# Patient Record
Sex: FEMALE | Race: WHITE | NOT HISPANIC OR LATINO | Employment: PART TIME | URBAN - METROPOLITAN AREA
[De-identification: names, ages, dates, MRNs, and addresses within clinical notes are randomized per-mention and may not be internally consistent; named-entity substitution may affect disease eponyms.]

---

## 2023-04-15 DIAGNOSIS — M19.90 OSTEOARTHRITIS, UNSPECIFIED OSTEOARTHRITIS TYPE, UNSPECIFIED SITE: Primary | ICD-10-CM

## 2023-04-18 RX ORDER — MELOXICAM 7.5 MG/1
TABLET ORAL
Qty: 60 TABLET | Refills: 3 | Status: SHIPPED | OUTPATIENT
Start: 2023-04-18 | End: 2023-08-07

## 2023-04-20 RX ORDER — TOPIRAMATE 50 MG/1
50 TABLET, FILM COATED ORAL 2 TIMES DAILY
COMMUNITY
End: 2023-06-30 | Stop reason: DRUGHIGH

## 2023-04-20 RX ORDER — DULOXETIN HYDROCHLORIDE 60 MG/1
60 CAPSULE, DELAYED RELEASE ORAL
COMMUNITY
Start: 2023-04-10

## 2023-04-20 RX ORDER — BUPROPION HYDROCHLORIDE 300 MG/1
1 TABLET ORAL
COMMUNITY
Start: 2023-04-10 | End: 2024-02-01 | Stop reason: ALTCHOICE

## 2023-04-20 RX ORDER — BUSPIRONE HYDROCHLORIDE 15 MG/1
15 TABLET ORAL 2 TIMES DAILY
COMMUNITY
Start: 2023-04-10

## 2023-04-20 RX ORDER — ESOMEPRAZOLE MAGNESIUM 40 MG/1
1 CAPSULE, DELAYED RELEASE ORAL DAILY
COMMUNITY
Start: 2021-10-05 | End: 2023-06-30 | Stop reason: ALTCHOICE

## 2023-04-20 RX ORDER — NORETHINDRONE ACETATE/ETHINYL ESTRADIOL AND FERROUS FUMARATE 1.5-30(21)
1 KIT ORAL DAILY
COMMUNITY
End: 2024-05-06 | Stop reason: ALTCHOICE

## 2023-04-20 RX ORDER — ACETAMINOPHEN 500 MG
1 TABLET ORAL DAILY
COMMUNITY
Start: 2019-01-23

## 2023-04-20 RX ORDER — CETIRIZINE HYDROCHLORIDE 10 MG/1
1 TABLET ORAL 2 TIMES DAILY
COMMUNITY
Start: 2021-02-04

## 2023-04-20 RX ORDER — CARVEDILOL 12.5 MG/1
12.5 TABLET ORAL 2 TIMES DAILY
COMMUNITY
End: 2023-09-06

## 2023-04-20 RX ORDER — ALBUTEROL SULFATE 90 UG/1
2 AEROSOL, METERED RESPIRATORY (INHALATION) EVERY 6 HOURS PRN
COMMUNITY
Start: 2020-03-04 | End: 2023-04-21 | Stop reason: SDUPTHER

## 2023-04-20 RX ORDER — CYCLOBENZAPRINE HCL 10 MG
10 TABLET ORAL 3 TIMES DAILY PRN
COMMUNITY
Start: 2021-10-05 | End: 2023-05-17

## 2023-04-20 RX ORDER — DULOXETIN HYDROCHLORIDE 30 MG/1
30 CAPSULE, DELAYED RELEASE ORAL
COMMUNITY
Start: 2023-04-10

## 2023-04-20 RX ORDER — ROSUVASTATIN CALCIUM 10 MG/1
10 TABLET, COATED ORAL DAILY
COMMUNITY
End: 2023-11-27

## 2023-04-20 RX ORDER — SENNOSIDES 8.6 MG/1
2 TABLET ORAL DAILY
COMMUNITY
End: 2024-02-01 | Stop reason: WASHOUT

## 2023-04-20 RX ORDER — TRAZODONE HYDROCHLORIDE 100 MG/1
1 TABLET ORAL NIGHTLY
COMMUNITY
Start: 2022-02-28

## 2023-04-20 RX ORDER — HYDROXYZINE PAMOATE 50 MG/1
50 CAPSULE ORAL 3 TIMES DAILY PRN
COMMUNITY
Start: 2021-07-19

## 2023-04-20 NOTE — PROGRESS NOTES
Subjective   Patient ID: Bree Lamar is a 32 y.o. female who presents for follow-up visit and lab work    HPI   The patient reports near constant aching pain throughout the lower back region since soon after her last office visit.  She reports that the only time she does not experience pain is when she is lying down.  She reports no radiation of discomfort.  She does report experiencing a brief episode of numbness in both eyes this morning but otherwise reports no bilateral lower extremity weakness/paresthesias, change in bowel/bladder function, preceding trauma/overexertion.    The patient reports continued chronic intermittent episodes of dull aching pain in both knees times years.  She again reports that the episodes may be precipitated by weather changes and by getting on the floor.  She reports no associated swelling or instability.  No other associated symptoms.  She does report an increase in the frequency and intensity of the episodes since her last office visit.    The patient reports a recent history of postnasal drip which she feels improves when she is able to use Flonase nasal spray and Zyrtec..  She also reports a recent history of dry mouth.  She does report continued chronic dyspnea with mild to moderate exertion-unchanged since her last office visit.  No other associated symptoms.    She reports less sweating since her last office visit.    She does report continued chronic near constant diffuse pruritus and continued chronic easier fatigability-unchanged since her last office visit.  No other new complaints.  Review of Systems    Objective   There were no vitals taken for this visit.    Physical Exam  Head-palpation revealed no tenderness over the maxillary or frontal sinuses  Nose-turbinates not erythematous or swollen  Mouth-no xerostomia noted.  Posterior pharynx not erythematous.  Tonsillar pillars appeared normal, no exudate  Cardiac-rate normal, rhythm regular positive S4 noted,,no  murmurs, no JVD.  Abdomen-soft, obese, hypoactive bowel sounds. Palpation revealed no tenderness or masses  Extremities-no peripheral edema    Musculoskeletal  Lumbar spine-no erythema or swelling.  Full range of motion with pain noted on flexion and to a lesser extent extension.  Full range of motion with no pain noted on rotation and bending bilaterally.  Palpation did reveal tenderness over the entire lumbosacral region extending to both buttocks, no increase in warmth  Knees-no erythema or swelling.  Full range of motion in all directions of motion with no pain.  Palpation did reveal mild tenderness over the medial surfaces of both knees as well as mild crepitus but no increase in warmth.  Negative Lachemann's test, negative Mary Kay test, negative patellar apprehension test  Neurologic  Lower extremities:  Motor-strength 5/5 in all muscle groups  Sensory  Light touch and pinprick sensation fully intact  Reflexes-1+/4 bilaterally  Assessment/Plan        Assessment  Hypertension-blood pressure at goal today.  Chronic dyspnea with mild to moderate exertion-may be secondary to lack of conditioning, postacute COVID-19 symptom.  COVID-19 December 2022, influenza, December 2022  Eustachian tube dysfunction right ear August 2021  Irritable bowel syndrome-mixed  Chronic intermittent episodes of diffuse arthralgias and myalgias--may be secondary to fibromyalgia-  Chronic intermittent episodes of dull aching pain in both knees-probably secondary to osteoarthritis.  Hyperlipidemia.  Chronic near constant diffuse pruritus-unsure of etiology.  May be secondary to xerosis.  Chronic easier fatigability-may represent a post COVID-19 symptom  Chronic intermittent episodes of dull pain over the entire head or over different regions of the head-may be secondary to chronic daily migraine.  Chronic near constant aching pain in the lower back region-probably secondary to osteoarthritis and degenerative disc disease of the lumbosacral  spine  Depression  Anxiety  Plan  Obtain hepatic function profile, fasting lipid profile today.  Obtain CBC differential as well today..  Obtain TSH, SSA and SSB antibodies today.  I have urged the patient to use Flonase nasal spray and Zyrtec on a as needed basis.  At patient request, she will discontinue over-the-counter Nexium and begin pantoprazole 40 mg daily.  I urged the patient to apply moisturizing cream such as Eucerin or Lubriderm twice daily over the body.  I also told the patient to apply Voltaren gel to painful regions every 4-6 hours as needed.  I recommended that she increase her topiramate dosage to 100 mg every morning, 50 mg p.o. at bedtime.  The patient also may be a good candidate for initiation of therapy with Ozempic or Mounjaro  I have asked that the patient continue all of her other current medications pending the results of lab work.  Patient should return for office visit in 3 months

## 2023-04-21 ENCOUNTER — OFFICE VISIT (OUTPATIENT)
Dept: PRIMARY CARE | Facility: CLINIC | Age: 33
End: 2023-04-21
Payer: MEDICAID

## 2023-04-21 ENCOUNTER — LAB (OUTPATIENT)
Dept: LAB | Facility: LAB | Age: 33
End: 2023-04-21
Payer: MEDICAID

## 2023-04-21 VITALS
WEIGHT: 292 LBS | DIASTOLIC BLOOD PRESSURE: 76 MMHG | SYSTOLIC BLOOD PRESSURE: 110 MMHG | BODY MASS INDEX: 48.59 KG/M2 | HEART RATE: 92 BPM

## 2023-04-21 DIAGNOSIS — R68.2 DRY MOUTH, UNSPECIFIED: ICD-10-CM

## 2023-04-21 DIAGNOSIS — E78.2 MIXED HYPERLIPIDEMIA: ICD-10-CM

## 2023-04-21 DIAGNOSIS — M79.7 FIBROMYALGIA: ICD-10-CM

## 2023-04-21 DIAGNOSIS — J98.01 BRONCHOSPASM: ICD-10-CM

## 2023-04-21 DIAGNOSIS — G43.709 CHRONIC MIGRAINE WITHOUT AURA WITHOUT STATUS MIGRAINOSUS, NOT INTRACTABLE: ICD-10-CM

## 2023-04-21 DIAGNOSIS — K30 FUNCTIONAL DYSPEPSIA: Primary | ICD-10-CM

## 2023-04-21 DIAGNOSIS — K30 FUNCTIONAL DYSPEPSIA: ICD-10-CM

## 2023-04-21 DIAGNOSIS — I10 PRIMARY HYPERTENSION: ICD-10-CM

## 2023-04-21 PROBLEM — E78.5 HYPERLIPIDEMIA: Status: ACTIVE | Noted: 2023-04-21

## 2023-04-21 PROBLEM — F41.9 ANXIETY DISORDER: Status: ACTIVE | Noted: 2023-04-21

## 2023-04-21 PROBLEM — K58.9 IBS (IRRITABLE BOWEL SYNDROME): Status: ACTIVE | Noted: 2023-04-21

## 2023-04-21 PROBLEM — R10.9 ABDOMINAL PAIN: Status: ACTIVE | Noted: 2023-04-21

## 2023-04-21 PROBLEM — G43.909 MIGRAINE: Status: ACTIVE | Noted: 2023-04-21

## 2023-04-21 LAB
ALANINE AMINOTRANSFERASE (SGPT) (U/L) IN SER/PLAS: 12 U/L (ref 7–45)
ALBUMIN (G/DL) IN SER/PLAS: 4.1 G/DL (ref 3.4–5)
ALKALINE PHOSPHATASE (U/L) IN SER/PLAS: 59 U/L (ref 33–110)
ANION GAP IN SER/PLAS: 16 MMOL/L (ref 10–20)
ANTI-SSA: <0.2 AI
ANTI-SSB: <0.2 AI
ASPARTATE AMINOTRANSFERASE (SGOT) (U/L) IN SER/PLAS: 12 U/L (ref 9–39)
BASOPHILS (10*3/UL) IN BLOOD BY AUTOMATED COUNT: 0.07 X10E9/L (ref 0–0.1)
BASOPHILS/100 LEUKOCYTES IN BLOOD BY AUTOMATED COUNT: 0.6 % (ref 0–2)
BILIRUBIN TOTAL (MG/DL) IN SER/PLAS: 0.3 MG/DL (ref 0–1.2)
CALCIUM (MG/DL) IN SER/PLAS: 9.6 MG/DL (ref 8.6–10.6)
CARBON DIOXIDE, TOTAL (MMOL/L) IN SER/PLAS: 21 MMOL/L (ref 21–32)
CHLORIDE (MMOL/L) IN SER/PLAS: 106 MMOL/L (ref 98–107)
CHOLESTEROL (MG/DL) IN SER/PLAS: 250 MG/DL (ref 0–199)
CHOLESTEROL IN HDL (MG/DL) IN SER/PLAS: 76.9 MG/DL
CHOLESTEROL/HDL RATIO: 3.3
CREATININE (MG/DL) IN SER/PLAS: 0.98 MG/DL (ref 0.5–1.05)
EOSINOPHILS (10*3/UL) IN BLOOD BY AUTOMATED COUNT: 0.27 X10E9/L (ref 0–0.7)
EOSINOPHILS/100 LEUKOCYTES IN BLOOD BY AUTOMATED COUNT: 2.5 % (ref 0–6)
ERYTHROCYTE DISTRIBUTION WIDTH (RATIO) BY AUTOMATED COUNT: 13.8 % (ref 11.5–14.5)
ERYTHROCYTE MEAN CORPUSCULAR HEMOGLOBIN CONCENTRATION (G/DL) BY AUTOMATED: 32.5 G/DL (ref 32–36)
ERYTHROCYTE MEAN CORPUSCULAR VOLUME (FL) BY AUTOMATED COUNT: 89 FL (ref 80–100)
ERYTHROCYTES (10*6/UL) IN BLOOD BY AUTOMATED COUNT: 3.96 X10E12/L (ref 4–5.2)
GFR FEMALE: 77 ML/MIN/1.73M2
GLUCOSE (MG/DL) IN SER/PLAS: 94 MG/DL (ref 74–99)
HEMATOCRIT (%) IN BLOOD BY AUTOMATED COUNT: 35.1 % (ref 36–46)
HEMOGLOBIN (G/DL) IN BLOOD: 11.4 G/DL (ref 12–16)
IMMATURE GRANULOCYTES/100 LEUKOCYTES IN BLOOD BY AUTOMATED COUNT: 0.5 % (ref 0–0.9)
LDL: 135 MG/DL (ref 0–99)
LEUKOCYTES (10*3/UL) IN BLOOD BY AUTOMATED COUNT: 11 X10E9/L (ref 4.4–11.3)
LYMPHOCYTES (10*3/UL) IN BLOOD BY AUTOMATED COUNT: 3.7 X10E9/L (ref 1.2–4.8)
LYMPHOCYTES/100 LEUKOCYTES IN BLOOD BY AUTOMATED COUNT: 33.7 % (ref 13–44)
MONOCYTES (10*3/UL) IN BLOOD BY AUTOMATED COUNT: 0.55 X10E9/L (ref 0.1–1)
MONOCYTES/100 LEUKOCYTES IN BLOOD BY AUTOMATED COUNT: 5 % (ref 2–10)
NEUTROPHILS (10*3/UL) IN BLOOD BY AUTOMATED COUNT: 6.33 X10E9/L (ref 1.2–7.7)
NEUTROPHILS/100 LEUKOCYTES IN BLOOD BY AUTOMATED COUNT: 57.7 % (ref 40–80)
NRBC (PER 100 WBCS) BY AUTOMATED COUNT: 0 /100 WBC (ref 0–0)
PLATELETS (10*3/UL) IN BLOOD AUTOMATED COUNT: 383 X10E9/L (ref 150–450)
POTASSIUM (MMOL/L) IN SER/PLAS: 3.9 MMOL/L (ref 3.5–5.3)
PROTEIN TOTAL: 6.9 G/DL (ref 6.4–8.2)
SODIUM (MMOL/L) IN SER/PLAS: 139 MMOL/L (ref 136–145)
THYROTROPIN (MIU/L) IN SER/PLAS BY DETECTION LIMIT <= 0.05 MIU/L: 2.74 MIU/L (ref 0.44–3.98)
TRIGLYCERIDE (MG/DL) IN SER/PLAS: 193 MG/DL (ref 0–149)
UREA NITROGEN (MG/DL) IN SER/PLAS: 11 MG/DL (ref 6–23)
VLDL: 39 MG/DL (ref 0–40)

## 2023-04-21 PROCEDURE — 99214 OFFICE O/P EST MOD 30 MIN: CPT | Performed by: INTERNAL MEDICINE

## 2023-04-21 PROCEDURE — 80061 LIPID PANEL: CPT

## 2023-04-21 PROCEDURE — 86235 NUCLEAR ANTIGEN ANTIBODY: CPT

## 2023-04-21 PROCEDURE — 84443 ASSAY THYROID STIM HORMONE: CPT

## 2023-04-21 PROCEDURE — 82728 ASSAY OF FERRITIN: CPT

## 2023-04-21 PROCEDURE — 3074F SYST BP LT 130 MM HG: CPT | Performed by: INTERNAL MEDICINE

## 2023-04-21 PROCEDURE — 3078F DIAST BP <80 MM HG: CPT | Performed by: INTERNAL MEDICINE

## 2023-04-21 PROCEDURE — 36415 COLL VENOUS BLD VENIPUNCTURE: CPT

## 2023-04-21 PROCEDURE — 85025 COMPLETE CBC W/AUTO DIFF WBC: CPT

## 2023-04-21 PROCEDURE — 82607 VITAMIN B-12: CPT

## 2023-04-21 PROCEDURE — 80053 COMPREHEN METABOLIC PANEL: CPT

## 2023-04-21 RX ORDER — ALBUTEROL SULFATE 90 UG/1
2 AEROSOL, METERED RESPIRATORY (INHALATION) EVERY 6 HOURS PRN
Qty: 18 G | Refills: 2 | Status: SHIPPED | OUTPATIENT
Start: 2023-04-21

## 2023-04-21 RX ORDER — PANTOPRAZOLE SODIUM 40 MG/1
40 TABLET, DELAYED RELEASE ORAL DAILY
Qty: 30 TABLET | Refills: 1 | Status: SHIPPED | OUTPATIENT
Start: 2023-04-21 | End: 2023-06-30 | Stop reason: SDUPTHER

## 2023-04-23 DIAGNOSIS — E78.2 MIXED HYPERLIPIDEMIA: Primary | ICD-10-CM

## 2023-04-23 DIAGNOSIS — E66.01 CLASS 3 SEVERE OBESITY DUE TO EXCESS CALORIES WITH SERIOUS COMORBIDITY AND BODY MASS INDEX (BMI) OF 40.0 TO 44.9 IN ADULT (MULTI): ICD-10-CM

## 2023-04-23 LAB
COBALAMIN (VITAMIN B12) (PG/ML) IN SER/PLAS: 435 PG/ML (ref 211–911)
FERRITIN (UG/LL) IN SER/PLAS: 26 UG/L (ref 8–150)

## 2023-04-23 RX ORDER — TIRZEPATIDE 2.5 MG/.5ML
2.5 INJECTION, SOLUTION SUBCUTANEOUS
Qty: 4 ML | Refills: 2 | Status: SHIPPED | OUTPATIENT
Start: 2023-04-23 | End: 2023-06-30 | Stop reason: SDUPTHER

## 2023-05-13 DIAGNOSIS — M79.7 FIBROMYALGIA: Primary | ICD-10-CM

## 2023-05-17 RX ORDER — CYCLOBENZAPRINE HCL 10 MG
TABLET ORAL
Qty: 90 TABLET | Refills: 3 | Status: SHIPPED | OUTPATIENT
Start: 2023-05-17 | End: 2023-11-27

## 2023-06-29 NOTE — PROGRESS NOTES
Subjective   Patient ID: Mal Lamar is a 32 y.o. adult who presents for back pain      HPI   The patient reports continued chronic near constant pain-unable to describe-throughout the lower back since her last office visit.  She reports an increase in the intensity of the pain with almost any movement.  She also reports experiencing frequent episodes of tingling throughout the lower extremities since her last office visit.  She reports no lower extremity weakness.  She reports no recent trauma/overexertion..  She does report a recent history of constipation as well.  She reports no change in bladder habits.  No other associated symptoms.  She reports a progressive increase in the frequency and intensity of pain since her last office visit  Review of Systems    Objective   There were no vitals taken for this visit.    Physical Exam  Musculoskeletal  Lumbar spine-no erythema or swelling.  Full range of motion with increased intensity of pain in all directions of motion with the exception of flexion.  Full range of motion with no increased intensity of pain on flexion.  Palpation revealed no increased tenderness or increase in warmth    Neurologic  Lower extremities:  Motor-strength 5/5 in all muscle groups tested  Sensory  Light touch and pinprick sensation fully intact  Reflexes 2+/4 bilaterally  Assessment/Plan        Assessment  Recent history of constipation-May be secondary to flare of irritable bowel syndrome-mixed  Near constant pain-unable to describe-in the lower back region with frequent episodes of tingling throughout the lower extremities-May be secondary to bilateral lumbar radiculopathies secondary to central canal stenosis lumbar spine, bilateral neuroforaminal stenosis lumbar spine.  Osteoarthritis and degenerative disc disease of the lumbosacral spine is a possible etiology.  Flare of fibromyalgia is a possible etiology.  Plan  Obtain x-rays of the lumbosacral spine today.  Begin Medrol Dosepak as  directed.  Begin application of a Lidoderm patch to the lower back region 12 hours on 12 hours off  I have urged the patient to continue cyclobenzaprine at a dose of 10 mg p.o. 3 times daily rather than 10 mg p.o. twice daily.  I told the patient to begin use of MiraLAX 17 g p.o. daily as needed.  The patient will probably be a good candidate for physical therapy referral

## 2023-06-30 ENCOUNTER — OFFICE VISIT (OUTPATIENT)
Dept: PRIMARY CARE | Facility: CLINIC | Age: 33
End: 2023-06-30

## 2023-06-30 VITALS
WEIGHT: 293 LBS | SYSTOLIC BLOOD PRESSURE: 114 MMHG | DIASTOLIC BLOOD PRESSURE: 94 MMHG | HEART RATE: 94 BPM | BODY MASS INDEX: 50.26 KG/M2

## 2023-06-30 DIAGNOSIS — E66.01 CLASS 3 SEVERE OBESITY DUE TO EXCESS CALORIES WITH SERIOUS COMORBIDITY AND BODY MASS INDEX (BMI) OF 40.0 TO 44.9 IN ADULT (MULTI): ICD-10-CM

## 2023-06-30 DIAGNOSIS — M79.7 FIBROMYALGIA: Primary | ICD-10-CM

## 2023-06-30 DIAGNOSIS — K30 FUNCTIONAL DYSPEPSIA: ICD-10-CM

## 2023-06-30 DIAGNOSIS — E78.2 MIXED HYPERLIPIDEMIA: ICD-10-CM

## 2023-06-30 PROCEDURE — 3074F SYST BP LT 130 MM HG: CPT | Performed by: INTERNAL MEDICINE

## 2023-06-30 PROCEDURE — 3080F DIAST BP >= 90 MM HG: CPT | Performed by: INTERNAL MEDICINE

## 2023-06-30 PROCEDURE — 99213 OFFICE O/P EST LOW 20 MIN: CPT | Performed by: INTERNAL MEDICINE

## 2023-06-30 RX ORDER — TIRZEPATIDE 2.5 MG/.5ML
2.5 INJECTION, SOLUTION SUBCUTANEOUS
Qty: 4 ML | Refills: 2 | Status: SHIPPED | OUTPATIENT
Start: 2023-06-30 | End: 2024-02-01

## 2023-06-30 RX ORDER — TOPIRAMATE 50 MG/1
TABLET, FILM COATED ORAL
Qty: 90 TABLET | Refills: 2 | Status: SHIPPED | OUTPATIENT
Start: 2023-06-30 | End: 2023-11-27

## 2023-06-30 RX ORDER — METHYLPREDNISOLONE 4 MG/1
TABLET ORAL
Qty: 21 TABLET | Refills: 0 | Status: SHIPPED | OUTPATIENT
Start: 2023-06-30 | End: 2023-07-07

## 2023-06-30 RX ORDER — LIDOCAINE 50 MG/G
1 PATCH TOPICAL DAILY
COMMUNITY
End: 2023-06-30 | Stop reason: SDUPTHER

## 2023-06-30 RX ORDER — PANTOPRAZOLE SODIUM 40 MG/1
40 TABLET, DELAYED RELEASE ORAL DAILY
Qty: 90 TABLET | Refills: 2 | Status: SHIPPED | OUTPATIENT
Start: 2023-06-30 | End: 2024-05-06

## 2023-06-30 RX ORDER — LIDOCAINE 50 MG/G
1 PATCH TOPICAL DAILY
Qty: 30 PATCH | Refills: 3 | Status: SHIPPED | OUTPATIENT
Start: 2023-06-30 | End: 2023-07-03 | Stop reason: SDUPTHER

## 2023-06-30 RX ORDER — TOPIRAMATE 50 MG/1
TABLET, FILM COATED ORAL
Qty: 90 TABLET | Refills: 2 | Status: SHIPPED | OUTPATIENT
Start: 2023-06-30 | End: 2023-06-30 | Stop reason: SDUPTHER

## 2023-07-02 ENCOUNTER — TELEPHONE (OUTPATIENT)
Dept: PRIMARY CARE | Facility: CLINIC | Age: 33
End: 2023-07-02
Payer: MEDICARE

## 2023-07-02 DIAGNOSIS — M54.59 MECHANICAL LOW BACK PAIN: Primary | ICD-10-CM

## 2023-07-03 ENCOUNTER — TELEPHONE (OUTPATIENT)
Dept: PRIMARY CARE | Facility: CLINIC | Age: 33
End: 2023-07-03

## 2023-07-03 DIAGNOSIS — E78.2 MIXED HYPERLIPIDEMIA: ICD-10-CM

## 2023-07-03 DIAGNOSIS — M79.7 FIBROMYALGIA: ICD-10-CM

## 2023-07-03 DIAGNOSIS — E66.01 CLASS 3 SEVERE OBESITY DUE TO EXCESS CALORIES WITH SERIOUS COMORBIDITY AND BODY MASS INDEX (BMI) OF 40.0 TO 44.9 IN ADULT (MULTI): ICD-10-CM

## 2023-07-03 RX ORDER — LIDOCAINE 50 MG/G
1 PATCH TOPICAL DAILY
Qty: 30 PATCH | Refills: 3 | Status: SHIPPED | OUTPATIENT
Start: 2023-07-03 | End: 2024-02-01

## 2023-07-03 RX ORDER — LIDOCAINE 50 MG/G
1 PATCH TOPICAL DAILY
Qty: 30 PATCH | Refills: 3 | Status: SHIPPED | OUTPATIENT
Start: 2023-07-03 | End: 2023-07-03 | Stop reason: SDUPTHER

## 2023-08-06 DIAGNOSIS — M19.90 OSTEOARTHRITIS, UNSPECIFIED OSTEOARTHRITIS TYPE, UNSPECIFIED SITE: ICD-10-CM

## 2023-08-07 RX ORDER — MELOXICAM 7.5 MG/1
TABLET ORAL
Qty: 60 TABLET | Refills: 3 | Status: SHIPPED | OUTPATIENT
Start: 2023-08-07 | End: 2023-12-26

## 2023-09-03 DIAGNOSIS — K30 FUNCTIONAL DYSPEPSIA: Primary | ICD-10-CM

## 2023-09-06 RX ORDER — CARVEDILOL 12.5 MG/1
12.5 TABLET ORAL 2 TIMES DAILY
Qty: 60 TABLET | Refills: 3 | Status: SHIPPED | OUTPATIENT
Start: 2023-09-06 | End: 2024-01-24

## 2023-11-24 DIAGNOSIS — M79.7 FIBROMYALGIA: ICD-10-CM

## 2023-11-27 RX ORDER — ROSUVASTATIN CALCIUM 10 MG/1
10 TABLET, COATED ORAL DAILY
Qty: 90 TABLET | Refills: 0 | Status: SHIPPED | OUTPATIENT
Start: 2023-11-27 | End: 2024-03-20 | Stop reason: SDUPTHER

## 2023-11-27 RX ORDER — CYCLOBENZAPRINE HCL 10 MG
TABLET ORAL
Qty: 90 TABLET | Refills: 0 | Status: SHIPPED | OUTPATIENT
Start: 2023-11-27 | End: 2024-01-02

## 2023-11-27 RX ORDER — TOPIRAMATE 50 MG/1
TABLET, FILM COATED ORAL
Qty: 90 TABLET | Refills: 0 | Status: SHIPPED | OUTPATIENT
Start: 2023-11-27 | End: 2024-01-02

## 2023-11-29 DIAGNOSIS — K30 FUNCTIONAL DYSPEPSIA: ICD-10-CM

## 2023-12-24 DIAGNOSIS — M19.90 OSTEOARTHRITIS, UNSPECIFIED OSTEOARTHRITIS TYPE, UNSPECIFIED SITE: ICD-10-CM

## 2023-12-26 RX ORDER — MELOXICAM 7.5 MG/1
TABLET ORAL
Qty: 60 TABLET | Refills: 3 | Status: SHIPPED | OUTPATIENT
Start: 2023-12-26 | End: 2024-05-06

## 2023-12-31 DIAGNOSIS — M79.7 FIBROMYALGIA: ICD-10-CM

## 2024-01-02 RX ORDER — TOPIRAMATE 50 MG/1
TABLET, FILM COATED ORAL
Qty: 270 TABLET | Refills: 3 | Status: SHIPPED | OUTPATIENT
Start: 2024-01-02

## 2024-01-02 RX ORDER — CYCLOBENZAPRINE HCL 10 MG
TABLET ORAL
Qty: 90 TABLET | Refills: 1 | Status: SHIPPED | OUTPATIENT
Start: 2024-01-02 | End: 2024-03-20 | Stop reason: SDUPTHER

## 2024-01-21 DIAGNOSIS — K30 FUNCTIONAL DYSPEPSIA: ICD-10-CM

## 2024-01-24 RX ORDER — CARVEDILOL 12.5 MG/1
12.5 TABLET ORAL 2 TIMES DAILY
Qty: 60 TABLET | Refills: 11 | Status: SHIPPED | OUTPATIENT
Start: 2024-01-24

## 2024-01-30 PROBLEM — M54.59 MECHANICAL LOW BACK PAIN: Status: ACTIVE | Noted: 2024-01-30

## 2024-01-30 PROBLEM — G37.9 DEMYELINATING DISORDER (MULTI): Status: ACTIVE | Noted: 2024-01-30

## 2024-01-30 PROBLEM — M25.531 WRIST PAIN, CHRONIC, RIGHT: Status: ACTIVE | Noted: 2024-01-30

## 2024-01-30 PROBLEM — F17.200 SMOKING: Status: ACTIVE | Noted: 2024-01-30

## 2024-01-30 PROBLEM — M54.12 CERVICAL RADICULOPATHY: Status: ACTIVE | Noted: 2024-01-30

## 2024-01-30 PROBLEM — H92.09 OTALGIA: Status: RESOLVED | Noted: 2024-01-30 | Resolved: 2024-01-30

## 2024-01-30 PROBLEM — R26.89 IMBALANCE: Status: ACTIVE | Noted: 2024-01-30

## 2024-01-30 PROBLEM — J02.9 SORE THROAT: Status: RESOLVED | Noted: 2024-01-30 | Resolved: 2024-01-30

## 2024-01-30 PROBLEM — R11.0 NAUSEA: Status: RESOLVED | Noted: 2024-01-30 | Resolved: 2024-01-30

## 2024-01-30 PROBLEM — M25.572 ACUTE LEFT ANKLE PAIN: Status: RESOLVED | Noted: 2024-01-30 | Resolved: 2024-01-30

## 2024-01-30 PROBLEM — F33.0 MILD EPISODE OF RECURRENT MAJOR DEPRESSIVE DISORDER (CMS-HCC): Chronic | Status: ACTIVE | Noted: 2021-06-21

## 2024-01-30 PROBLEM — M19.90 OSTEOARTHRITIS: Status: ACTIVE | Noted: 2024-01-30

## 2024-01-30 PROBLEM — F43.9 STRESS: Status: ACTIVE | Noted: 2024-01-30

## 2024-01-30 PROBLEM — R19.7 DIARRHEA: Status: RESOLVED | Noted: 2024-01-30 | Resolved: 2024-01-30

## 2024-01-30 PROBLEM — H10.13 ALLERGIC CONJUNCTIVITIS OF BOTH EYES: Status: RESOLVED | Noted: 2024-01-30 | Resolved: 2024-01-30

## 2024-01-30 PROBLEM — H66.91 RIGHT ACUTE OTITIS MEDIA: Status: RESOLVED | Noted: 2024-01-30 | Resolved: 2024-01-30

## 2024-01-30 PROBLEM — E55.9 VITAMIN D DEFICIENCY: Status: ACTIVE | Noted: 2024-01-30

## 2024-01-30 PROBLEM — J01.00 ACUTE NON-RECURRENT MAXILLARY SINUSITIS: Status: RESOLVED | Noted: 2024-01-30 | Resolved: 2024-01-30

## 2024-01-30 PROBLEM — R20.2 PARESTHESIA OF HAND: Status: ACTIVE | Noted: 2024-01-30

## 2024-01-30 PROBLEM — B37.2 CANDIDAL INTERTRIGO: Status: ACTIVE | Noted: 2024-01-30

## 2024-01-30 PROBLEM — B34.9 PHARYNGITIS WITH VIRAL SYNDROME: Status: RESOLVED | Noted: 2024-01-30 | Resolved: 2024-01-30

## 2024-01-30 PROBLEM — J34.0 ABSCESS OF NOSE: Status: RESOLVED | Noted: 2024-01-30 | Resolved: 2024-01-30

## 2024-01-30 PROBLEM — H69.91 ACUTE DYSFUNCTION OF RIGHT EUSTACHIAN TUBE: Status: RESOLVED | Noted: 2024-01-30 | Resolved: 2024-01-30

## 2024-01-30 PROBLEM — L30.4 INTERTRIGO: Status: ACTIVE | Noted: 2024-01-30

## 2024-01-30 PROBLEM — R29.898 HAND WEAKNESS: Status: ACTIVE | Noted: 2024-01-30

## 2024-01-30 PROBLEM — H93.8X9 EAR LUMP: Status: ACTIVE | Noted: 2024-01-30

## 2024-01-30 PROBLEM — F84.0 AUTISM (HHS-HCC): Status: ACTIVE | Noted: 2021-06-21

## 2024-01-30 PROBLEM — G89.29 WRIST PAIN, CHRONIC, RIGHT: Status: ACTIVE | Noted: 2024-01-30

## 2024-01-30 PROBLEM — S96.912A STRAIN OF LEFT ANKLE: Status: RESOLVED | Noted: 2024-01-30 | Resolved: 2024-01-30

## 2024-01-30 PROBLEM — N93.8 DUB (DYSFUNCTIONAL UTERINE BLEEDING): Status: ACTIVE | Noted: 2024-01-30

## 2024-01-30 PROBLEM — J34.0 CELLULITIS OF NOSE, EXTERNAL: Status: RESOLVED | Noted: 2024-01-30 | Resolved: 2024-01-30

## 2024-01-30 PROBLEM — N39.0 URINARY TRACT INFECTION: Status: RESOLVED | Noted: 2024-01-30 | Resolved: 2024-01-30

## 2024-01-30 PROBLEM — K04.7 DENTAL INFECTION: Status: RESOLVED | Noted: 2024-01-30 | Resolved: 2024-01-30

## 2024-01-30 PROBLEM — R50.9 FEVER, INTERMITTENT: Status: RESOLVED | Noted: 2024-01-30 | Resolved: 2024-01-30

## 2024-01-30 PROBLEM — H10.9 CONJUNCTIVITIS: Status: RESOLVED | Noted: 2024-01-30 | Resolved: 2024-01-30

## 2024-01-30 PROBLEM — J02.9 PHARYNGITIS WITH VIRAL SYNDROME: Status: RESOLVED | Noted: 2024-01-30 | Resolved: 2024-01-30

## 2024-01-30 PROBLEM — J45.909 ASTHMATIC BRONCHITIS (HHS-HCC): Status: RESOLVED | Noted: 2024-01-30 | Resolved: 2024-01-30

## 2024-01-30 PROBLEM — N92.0 MENORRHAGIA WITH REGULAR CYCLE: Status: ACTIVE | Noted: 2024-01-30

## 2024-01-30 PROBLEM — G43.829 MENSTRUAL MIGRAINE WITHOUT STATUS MIGRAINOSUS, NOT INTRACTABLE: Status: ACTIVE | Noted: 2024-01-30

## 2024-01-30 RX ORDER — TRAZODONE HYDROCHLORIDE 50 MG/1
TABLET ORAL
COMMUNITY
Start: 2021-03-30 | End: 2024-05-06 | Stop reason: ALTCHOICE

## 2024-01-30 RX ORDER — CLOTRIMAZOLE AND BETAMETHASONE DIPROPIONATE 10; .64 MG/G; MG/G
CREAM TOPICAL
COMMUNITY
Start: 2021-05-25

## 2024-01-30 RX ORDER — IBUPROFEN 100 MG/5ML
1000 SUSPENSION, ORAL (FINAL DOSE FORM) ORAL
COMMUNITY
End: 2024-02-01 | Stop reason: ALTCHOICE

## 2024-02-01 ENCOUNTER — HOSPITAL ENCOUNTER (OUTPATIENT)
Dept: VASCULAR MEDICINE | Facility: HOSPITAL | Age: 34
Discharge: HOME | End: 2024-02-01
Payer: MEDICARE

## 2024-02-01 ENCOUNTER — LAB (OUTPATIENT)
Dept: LAB | Facility: LAB | Age: 34
End: 2024-02-01
Payer: MEDICARE

## 2024-02-01 ENCOUNTER — OFFICE VISIT (OUTPATIENT)
Dept: PRIMARY CARE | Facility: CLINIC | Age: 34
End: 2024-02-01
Payer: MEDICARE

## 2024-02-01 VITALS
DIASTOLIC BLOOD PRESSURE: 85 MMHG | WEIGHT: 293 LBS | OXYGEN SATURATION: 94 % | BODY MASS INDEX: 50.02 KG/M2 | HEART RATE: 93 BPM | SYSTOLIC BLOOD PRESSURE: 127 MMHG | HEIGHT: 64 IN

## 2024-02-01 DIAGNOSIS — R06.09 DOE (DYSPNEA ON EXERTION): ICD-10-CM

## 2024-02-01 DIAGNOSIS — R60.0 BILATERAL LEG EDEMA: Primary | ICD-10-CM

## 2024-02-01 DIAGNOSIS — G43.829 MENSTRUAL MIGRAINE WITHOUT STATUS MIGRAINOSUS, NOT INTRACTABLE: ICD-10-CM

## 2024-02-01 DIAGNOSIS — L98.9 SKIN LESION: ICD-10-CM

## 2024-02-01 DIAGNOSIS — R60.0 BILATERAL LEG EDEMA: ICD-10-CM

## 2024-02-01 DIAGNOSIS — R11.0 NAUSEA IN ADULT: ICD-10-CM

## 2024-02-01 PROBLEM — D64.9 ANEMIA: Status: ACTIVE | Noted: 2024-02-01

## 2024-02-01 LAB
ALBUMIN SERPL BCP-MCNC: 4.1 G/DL (ref 3.4–5)
ALP SERPL-CCNC: 73 U/L (ref 33–120)
ALT SERPL W P-5'-P-CCNC: 31 U/L (ref 7–52)
ANION GAP SERPL CALC-SCNC: 16 MMOL/L (ref 10–20)
AST SERPL W P-5'-P-CCNC: 36 U/L (ref 9–39)
BILIRUB SERPL-MCNC: 0.3 MG/DL (ref 0–1.2)
BNP SERPL-MCNC: 7 PG/ML (ref 0–99)
BUN SERPL-MCNC: 18 MG/DL (ref 6–23)
CALCIUM SERPL-MCNC: 9.4 MG/DL (ref 8.6–10.3)
CHLORIDE SERPL-SCNC: 106 MMOL/L (ref 98–107)
CO2 SERPL-SCNC: 20 MMOL/L (ref 21–32)
CREAT SERPL-MCNC: 0.93 MG/DL (ref 0.5–1.3)
D DIMER PPP FEU-MCNC: 303 NG/ML FEU
EGFRCR SERPLBLD CKD-EPI 2021: 82 ML/MIN/1.73M*2
GLUCOSE SERPL-MCNC: 89 MG/DL (ref 74–99)
MAGNESIUM SERPL-MCNC: 1.9 MG/DL (ref 1.6–2.4)
POC APPEARANCE, URINE: CLEAR
POC BILIRUBIN, URINE: NEGATIVE
POC BLOOD, URINE: ABNORMAL
POC COLOR, URINE: YELLOW
POC GLUCOSE, URINE: NEGATIVE MG/DL
POC KETONES, URINE: NEGATIVE MG/DL
POC LEUKOCYTES, URINE: ABNORMAL
POC NITRITE,URINE: NEGATIVE
POC PH, URINE: 6 PH
POC PROTEIN, URINE: NEGATIVE MG/DL
POC SPECIFIC GRAVITY, URINE: 1.01
POC UROBILINOGEN, URINE: 0.2 EU/DL
POTASSIUM SERPL-SCNC: 4.3 MMOL/L (ref 3.5–5.3)
PROT SERPL-MCNC: 6.8 G/DL (ref 6.4–8.2)
SODIUM SERPL-SCNC: 138 MMOL/L (ref 136–145)
TSH SERPL-ACNC: 3.22 MIU/L (ref 0.44–3.98)

## 2024-02-01 PROCEDURE — 3074F SYST BP LT 130 MM HG: CPT | Performed by: FAMILY MEDICINE

## 2024-02-01 PROCEDURE — 80053 COMPREHEN METABOLIC PANEL: CPT

## 2024-02-01 PROCEDURE — 99214 OFFICE O/P EST MOD 30 MIN: CPT | Performed by: FAMILY MEDICINE

## 2024-02-01 PROCEDURE — 83735 ASSAY OF MAGNESIUM: CPT

## 2024-02-01 PROCEDURE — 84443 ASSAY THYROID STIM HORMONE: CPT

## 2024-02-01 PROCEDURE — 83880 ASSAY OF NATRIURETIC PEPTIDE: CPT

## 2024-02-01 PROCEDURE — 3008F BODY MASS INDEX DOCD: CPT | Performed by: FAMILY MEDICINE

## 2024-02-01 PROCEDURE — 85379 FIBRIN DEGRADATION QUANT: CPT

## 2024-02-01 PROCEDURE — 93970 EXTREMITY STUDY: CPT | Performed by: INTERNAL MEDICINE

## 2024-02-01 PROCEDURE — 81003 URINALYSIS AUTO W/O SCOPE: CPT | Performed by: FAMILY MEDICINE

## 2024-02-01 PROCEDURE — 93970 EXTREMITY STUDY: CPT

## 2024-02-01 PROCEDURE — 3079F DIAST BP 80-89 MM HG: CPT | Performed by: FAMILY MEDICINE

## 2024-02-01 PROCEDURE — 1036F TOBACCO NON-USER: CPT | Performed by: FAMILY MEDICINE

## 2024-02-01 RX ORDER — ONDANSETRON 4 MG/1
1 TABLET, FILM COATED ORAL EVERY 8 HOURS PRN
COMMUNITY
End: 2024-02-01 | Stop reason: SDUPTHER

## 2024-02-01 RX ORDER — SUMATRIPTAN SUCCINATE 100 MG/1
100 TABLET ORAL ONCE AS NEEDED
COMMUNITY
End: 2024-02-01 | Stop reason: SDUPTHER

## 2024-02-01 RX ORDER — ONDANSETRON 4 MG/1
4 TABLET, FILM COATED ORAL EVERY 8 HOURS PRN
Qty: 20 TABLET | Refills: 0 | Status: SHIPPED | OUTPATIENT
Start: 2024-02-01

## 2024-02-01 RX ORDER — SUMATRIPTAN SUCCINATE 100 MG/1
100 TABLET ORAL ONCE AS NEEDED
Qty: 9 TABLET | Refills: 0 | Status: SHIPPED | OUTPATIENT
Start: 2024-02-01

## 2024-02-01 ASSESSMENT — ENCOUNTER SYMPTOMS
CONFUSION: 0
DIARRHEA: 0
NUMBNESS: 0
DIFFICULTY URINATING: 0
CHILLS: 0
LIGHT-HEADEDNESS: 0
FEVER: 0
UNEXPECTED WEIGHT CHANGE: 0
DYSURIA: 0
OCCASIONAL FEELINGS OF UNSTEADINESS: 0
LOSS OF SENSATION IN FEET: 0
COUGH: 0
DEPRESSION: 0
ABDOMINAL PAIN: 0
VOMITING: 0
NAUSEA: 0
DIZZINESS: 0
TROUBLE SWALLOWING: 0
PALPITATIONS: 0
BLOOD IN STOOL: 0
WHEEZING: 0
SHORTNESS OF BREATH: 0
WEAKNESS: 0

## 2024-02-01 ASSESSMENT — PATIENT HEALTH QUESTIONNAIRE - PHQ9
2. FEELING DOWN, DEPRESSED OR HOPELESS: SEVERAL DAYS
SUM OF ALL RESPONSES TO PHQ9 QUESTIONS 1 AND 2: 2
10. IF YOU CHECKED OFF ANY PROBLEMS, HOW DIFFICULT HAVE THESE PROBLEMS MADE IT FOR YOU TO DO YOUR WORK, TAKE CARE OF THINGS AT HOME, OR GET ALONG WITH OTHER PEOPLE: SOMEWHAT DIFFICULT
1. LITTLE INTEREST OR PLEASURE IN DOING THINGS: SEVERAL DAYS

## 2024-02-01 ASSESSMENT — COLUMBIA-SUICIDE SEVERITY RATING SCALE - C-SSRS
6. HAVE YOU EVER DONE ANYTHING, STARTED TO DO ANYTHING, OR PREPARED TO DO ANYTHING TO END YOUR LIFE?: YES
6. HAVE YOU EVER DONE ANYTHING, STARTED TO DO ANYTHING, OR PREPARED TO DO ANYTHING TO END YOUR LIFE?: NO
2. HAVE YOU ACTUALLY HAD ANY THOUGHTS OF KILLING YOURSELF?: NO
1. IN THE PAST MONTH, HAVE YOU WISHED YOU WERE DEAD OR WISHED YOU COULD GO TO SLEEP AND NOT WAKE UP?: NO

## 2024-02-01 NOTE — PROGRESS NOTES
"Subjective   Patient ID: Mal Lamar is a 33 y.o. adult who presents for Establish Care (Pt presents as a new to you pt to establish care, c/o edema feet bi-lateral, requesting labs, will need  rx's at this time.BL).    HPI     c/o bilateral swelling of feet. Started Dec 2023, never happened before. First right was worse, now left is worse. Denies pain/bruising/redness in the legs, chest/side/back pain, pain with deep inhalation, SOB, difficulty breathing. No recent medication change except stopped Wellbutrin. Tried reducing sodium intake, not sure if it helped.    Taking OCP, but quit smoking 8/2022, vaping at least 8 months ago.    c/o CHAN, but has gradually worsened with increasing weight, and since having COVID early Jan 2024.    Review of Systems   Constitutional:  Negative for chills, fever and unexpected weight change.   HENT:  Negative for ear pain and trouble swallowing.    Respiratory:  Negative for cough, shortness of breath and wheezing.    Cardiovascular:  Positive for leg swelling. Negative for chest pain and palpitations.   Gastrointestinal:  Negative for abdominal pain, blood in stool, diarrhea, nausea and vomiting.   Genitourinary:  Negative for difficulty urinating and dysuria.   Skin:  Negative for rash.   Neurological:  Negative for dizziness, syncope, weakness, light-headedness and numbness.   Psychiatric/Behavioral:  Negative for behavioral problems and confusion.        Objective   /85   Pulse 93   Ht 1.632 m (5' 4.25\")   Wt 139 kg (307 lb 8 oz)   SpO2 94%   BMI 52.37 kg/m²     Physical Exam  Vitals and nursing note reviewed.   Constitutional:       General: Bree Lamar \"Mal\" is not in acute distress.     Appearance: Bree Lamar \"Mal\" is not diaphoretic.   HENT:      Head: Normocephalic and atraumatic.   Eyes:      General: No scleral icterus.     Conjunctiva/sclera: Conjunctivae normal.   Cardiovascular:      Rate and Rhythm: Normal rate and regular rhythm.     " " Heart sounds: Normal heart sounds.   Pulmonary:      Effort: Pulmonary effort is normal.      Breath sounds: Normal breath sounds. No wheezing, rhonchi or rales.   Musculoskeletal:      Right lower leg: No tenderness (negative Nenita sign). 2+ Pitting Edema (49.5 cm) present.      Left lower leg: No tenderness (negative Nenita sign). 2+ Pitting Edema (53 cm) present.   Skin:     General: Skin is warm and dry.      Findings: Lesion (left breast) present.   Neurological:      General: No focal deficit present.      Mental Status: Bree Lamar \"Mal\" is alert. Mental status is at baseline.   Psychiatric:         Mood and Affect: Mood normal.         Thought Content: Thought content normal.         Assessment/Plan   Problem List Items Addressed This Visit             ICD-10-CM    Menstrual migraine without status migrainosus, not intractable G43.829     Reports rare need for Imitrex. Refill.         Nausea in adult R11.0     Rare. Likely secondary to gastritis/TED v. anxiety.         Bilateral leg edema - Primary R60.0     Asymmetric L>R. Wells DVT = -1 (venous insufficiency more likely). Recent COVID. Venous duplex.    UA neg for protein.         Relevant Medications    ondansetron (Zofran) 4 mg tablet    SUMAtriptan (Imitrex) 100 mg tablet    Other Relevant Orders    Comprehensive Metabolic Panel    TSH with reflex to Free T4 if abnormal    Magnesium    POCT UA Automated manually resulted    Vascular US lower extremity venous duplex bilateral    Referral to Dermatology    Transthoracic Echo Complete    D-dimer, quantitative    Skin lesion L98.9     Follow-up with dermatology, especially if not cleared up within 2 weeks.         CHAN (dyspnea on exertion) R06.09     Unlikely cardiac or VTE, but check BNP, echocardiogram, and D-dimer.         Relevant Orders    Transthoracic Echo Complete    B-type natriuretic peptide    D-dimer, quantitative          "

## 2024-02-01 NOTE — ASSESSMENT & PLAN NOTE
Asymmetric L>R. Wells DVT = -1 (venous insufficiency more likely). Recent COVID. Venous duplex.    UA neg for protein.

## 2024-02-01 NOTE — PATIENT INSTRUCTIONS
Recommend no more than 2000 mg of sodium daily.    Dermatology  Dr. Daly Meza, Chris Canela CNP PhD, et al. ([place]) [phone]    [facility]  Dr. Jensen Quintero NP-C  Lena Vergara PALOUISE Newanda Inés FNP-C  [address] [place] [address]  [phone]    Dr. Lucero Diaz PA-RAJENDRA Youngblood NP-C  [address], [place]  [phone]    [facility]  [address], [address]  [place] [address]  [phone]    [address], [address]  [place] [address]  [phone]    [address], [address]  [place], [place] [address] [phone]       Please return for a(n) follow-up appointment after tests to review results and options, earlier if any question or concern.    Avoid taking Biotin for a week prior to any blood tests, as it can interfere with certain results. Fasting for labs means 12 hours, nothing to eat or drink, except water and medications, unless directed otherwise.    For assistance with scheduling referrals or consultations, please call [phone]. For laboratory, radiology, and other tests, please call [phone] ([phone] for pediatrics). Please review prescription inserts and published information for possible adverse effects of all medications. Return after testing or consultation to review results and recommendations, if symptoms persist, change, worsen, or return, or if you have any question or concern. If you do not get results within 7-10 days, or you have any question or concern, please send a message, call the office ([phone]), or return to the office for a follow-up appointment. For non-emergencies, you may call the office. For emergencies, call 9-1-1 or go to the nearest Emergency Department. Please schedule additional appointment(s) to address concern(s) not addressed today.    In general, results are not released or discussed over the telephone, but at an  appointment or via  [employer]. Results of tests done through [facility] are released via  Househappy (see below).  [email or web address]   Househappy support line: [phone]

## 2024-02-04 DIAGNOSIS — R59.0 LYMPHADENOPATHY, INGUINAL: Primary | ICD-10-CM

## 2024-02-04 DIAGNOSIS — R60.0 LOCALIZED EDEMA: ICD-10-CM

## 2024-03-11 PROCEDURE — 87624 HPV HI-RISK TYP POOLED RSLT: CPT

## 2024-03-11 PROCEDURE — 88175 CYTOPATH C/V AUTO FLUID REDO: CPT

## 2024-03-12 ENCOUNTER — LAB REQUISITION (OUTPATIENT)
Dept: LAB | Facility: HOSPITAL | Age: 34
End: 2024-03-12
Payer: MEDICARE

## 2024-03-12 DIAGNOSIS — Z01.419 ENCOUNTER FOR GYNECOLOGICAL EXAMINATION (GENERAL) (ROUTINE) WITHOUT ABNORMAL FINDINGS: ICD-10-CM

## 2024-03-12 DIAGNOSIS — Z11.51 ENCOUNTER FOR SCREENING FOR HUMAN PAPILLOMAVIRUS (HPV): ICD-10-CM

## 2024-03-20 ENCOUNTER — OFFICE VISIT (OUTPATIENT)
Dept: PRIMARY CARE | Facility: CLINIC | Age: 34
End: 2024-03-20
Payer: MEDICARE

## 2024-03-20 ENCOUNTER — HOSPITAL ENCOUNTER (OUTPATIENT)
Dept: RADIOLOGY | Facility: CLINIC | Age: 34
Discharge: HOME | End: 2024-03-20
Payer: MEDICARE

## 2024-03-20 ENCOUNTER — LAB (OUTPATIENT)
Dept: LAB | Facility: LAB | Age: 34
End: 2024-03-20
Payer: MEDICARE

## 2024-03-20 VITALS
TEMPERATURE: 97.9 F | HEIGHT: 65 IN | OXYGEN SATURATION: 97 % | BODY MASS INDEX: 48.82 KG/M2 | DIASTOLIC BLOOD PRESSURE: 81 MMHG | HEART RATE: 93 BPM | WEIGHT: 293 LBS | SYSTOLIC BLOOD PRESSURE: 137 MMHG

## 2024-03-20 DIAGNOSIS — R10.32 LLQ ABDOMINAL PAIN: ICD-10-CM

## 2024-03-20 DIAGNOSIS — R10.32 LLQ ABDOMINAL PAIN: Primary | ICD-10-CM

## 2024-03-20 DIAGNOSIS — E78.5 HYPERLIPIDEMIA, UNSPECIFIED HYPERLIPIDEMIA TYPE: ICD-10-CM

## 2024-03-20 DIAGNOSIS — R82.90 ABNORMAL RESULT ON SCREENING URINE TEST: ICD-10-CM

## 2024-03-20 DIAGNOSIS — M79.7 FIBROMYALGIA: ICD-10-CM

## 2024-03-20 LAB
ALBUMIN SERPL BCP-MCNC: 4 G/DL (ref 3.4–5)
ALP SERPL-CCNC: 89 U/L (ref 33–120)
ALT SERPL W P-5'-P-CCNC: 15 U/L (ref 7–52)
ANION GAP SERPL CALC-SCNC: 15 MMOL/L (ref 10–20)
AST SERPL W P-5'-P-CCNC: 20 U/L (ref 9–39)
BILIRUB SERPL-MCNC: 0.4 MG/DL (ref 0–1.2)
BUN SERPL-MCNC: 16 MG/DL (ref 6–23)
CALCIUM SERPL-MCNC: 8.9 MG/DL (ref 8.6–10.3)
CHLORIDE SERPL-SCNC: 104 MMOL/L (ref 98–107)
CHOLEST SERPL-MCNC: 204 MG/DL (ref 0–199)
CHOLESTEROL/HDL RATIO: 2.7
CK SERPL-CCNC: 49 U/L (ref 0–325)
CO2 SERPL-SCNC: 21 MMOL/L (ref 21–32)
CREAT SERPL-MCNC: 1.46 MG/DL (ref 0.5–1.3)
EGFRCR SERPLBLD CKD-EPI 2021: 48 ML/MIN/1.73M*2
ERYTHROCYTE [DISTWIDTH] IN BLOOD BY AUTOMATED COUNT: 13.6 % (ref 11.5–14.5)
GLUCOSE SERPL-MCNC: 105 MG/DL (ref 74–99)
HCT VFR BLD AUTO: 39 % (ref 36–52)
HDLC SERPL-MCNC: 76.9 MG/DL
HGB BLD-MCNC: 12.3 G/DL (ref 12–17.5)
LACTATE SERPL-SCNC: 1.4 MMOL/L (ref 0.4–2)
LDLC SERPL CALC-MCNC: 77 MG/DL
LIPASE SERPL-CCNC: 10 U/L (ref 9–82)
MCH RBC QN AUTO: 28.5 PG (ref 26–34)
MCHC RBC AUTO-ENTMCNC: 31.5 G/DL (ref 32–36)
MCV RBC AUTO: 90 FL (ref 80–100)
NON HDL CHOLESTEROL: 127 MG/DL (ref 0–149)
NRBC BLD-RTO: 0 /100 WBCS (ref 0–0)
PLATELET # BLD AUTO: 414 X10*3/UL (ref 150–450)
POC APPEARANCE, URINE: CLEAR
POC BILIRUBIN, URINE: NEGATIVE
POC BLOOD, URINE: ABNORMAL
POC COLOR, URINE: YELLOW
POC GLUCOSE, URINE: NEGATIVE MG/DL
POC KETONES, URINE: NEGATIVE MG/DL
POC LEUKOCYTES, URINE: ABNORMAL
POC NITRITE,URINE: NEGATIVE
POC PH, URINE: 7 PH
POC PROTEIN, URINE: NEGATIVE MG/DL
POC SPECIFIC GRAVITY, URINE: 1.01
POC UROBILINOGEN, URINE: 0.2 EU/DL
POTASSIUM SERPL-SCNC: 4.3 MMOL/L (ref 3.5–5.3)
PREGNANCY TEST URINE, POC: NEGATIVE
PROT SERPL-MCNC: 7 G/DL (ref 6.4–8.2)
RBC # BLD AUTO: 4.32 X10*6/UL (ref 4–5.9)
SODIUM SERPL-SCNC: 136 MMOL/L (ref 136–145)
TRIGL SERPL-MCNC: 249 MG/DL (ref 0–149)
VLDL: 50 MG/DL (ref 0–40)
WBC # BLD AUTO: 11 X10*3/UL (ref 4.4–11.3)

## 2024-03-20 PROCEDURE — 36415 COLL VENOUS BLD VENIPUNCTURE: CPT

## 2024-03-20 PROCEDURE — 99214 OFFICE O/P EST MOD 30 MIN: CPT | Performed by: FAMILY MEDICINE

## 2024-03-20 PROCEDURE — 3079F DIAST BP 80-89 MM HG: CPT | Performed by: FAMILY MEDICINE

## 2024-03-20 PROCEDURE — 87086 URINE CULTURE/COLONY COUNT: CPT

## 2024-03-20 PROCEDURE — 85027 COMPLETE CBC AUTOMATED: CPT

## 2024-03-20 PROCEDURE — 1036F TOBACCO NON-USER: CPT | Performed by: FAMILY MEDICINE

## 2024-03-20 PROCEDURE — 81003 URINALYSIS AUTO W/O SCOPE: CPT | Performed by: FAMILY MEDICINE

## 2024-03-20 PROCEDURE — 81025 URINE PREGNANCY TEST: CPT | Performed by: FAMILY MEDICINE

## 2024-03-20 PROCEDURE — 83690 ASSAY OF LIPASE: CPT

## 2024-03-20 PROCEDURE — 3075F SYST BP GE 130 - 139MM HG: CPT | Performed by: FAMILY MEDICINE

## 2024-03-20 PROCEDURE — 83605 ASSAY OF LACTIC ACID: CPT

## 2024-03-20 PROCEDURE — 80053 COMPREHEN METABOLIC PANEL: CPT

## 2024-03-20 PROCEDURE — 80061 LIPID PANEL: CPT

## 2024-03-20 PROCEDURE — 82550 ASSAY OF CK (CPK): CPT

## 2024-03-20 PROCEDURE — 3008F BODY MASS INDEX DOCD: CPT | Performed by: FAMILY MEDICINE

## 2024-03-20 PROCEDURE — 74018 RADEX ABDOMEN 1 VIEW: CPT | Performed by: RADIOLOGY

## 2024-03-20 PROCEDURE — 74018 RADEX ABDOMEN 1 VIEW: CPT

## 2024-03-20 RX ORDER — ROSUVASTATIN CALCIUM 10 MG/1
10 TABLET, COATED ORAL NIGHTLY
Qty: 90 TABLET | Refills: 1 | Status: SHIPPED | OUTPATIENT
Start: 2024-03-20

## 2024-03-20 RX ORDER — CYCLOBENZAPRINE HCL 10 MG
10 TABLET ORAL 3 TIMES DAILY PRN
Qty: 90 TABLET | Refills: 1 | Status: SHIPPED | OUTPATIENT
Start: 2024-03-20 | End: 2024-05-20

## 2024-03-20 ASSESSMENT — ENCOUNTER SYMPTOMS
FLANK PAIN: 0
COUGH: 0
NUMBNESS: 0
WHEEZING: 0
UNEXPECTED WEIGHT CHANGE: 0
WEAKNESS: 0
TROUBLE SWALLOWING: 0
BLOOD IN STOOL: 0
NAUSEA: 0
DIZZINESS: 0
DIARRHEA: 0
DIFFICULTY URINATING: 0
ABDOMINAL PAIN: 1
SHORTNESS OF BREATH: 0
CONFUSION: 0
VOMITING: 0
LIGHT-HEADEDNESS: 0
DYSURIA: 0
FEVER: 0
CHILLS: 0

## 2024-03-20 NOTE — PROGRESS NOTES
"Subjective   Patient ID: Bree Lamar \"Mal\" is a 33 y.o. adult who presents for Abdominal Pain (Pt presents c/o LL abdominal x 5 days, states unable to leave urine sample.BL).  HPI    c/o LLQ abdominal pain, a type which she has never had before, but is Reminiscent of prior kidney stone pain. Started Friday (5d ago). Intermittent. About the same. Sometimes Motrin or Tylenol will dull it. Pain close to kidney stone level.     Denies new or different flank/back pain, NVD, bloody or black tarry stool, diarrhea, constipation, fever, chills, sweats. Usual bowel habits.    Has had a kidney stone once previously, >10y ago.    Little bit of spotting last month, otherwise OCP suppresses her menstrual flow. Was recently at the gynecologist.    Review of Systems   Constitutional:  Negative for chills, fever and unexpected weight change.   HENT:  Negative for ear pain and trouble swallowing.    Respiratory:  Negative for cough, shortness of breath and wheezing.    Cardiovascular:  Negative for chest pain.   Gastrointestinal:  Positive for abdominal pain. Negative for blood in stool, diarrhea, nausea and vomiting.   Genitourinary:  Negative for difficulty urinating, dyspareunia (but no recent sexual activity), dysuria, flank pain and vaginal discharge.   Skin:  Negative for rash.   Neurological:  Negative for dizziness, syncope, weakness, light-headedness and numbness.   Psychiatric/Behavioral:  Negative for behavioral problems and confusion.          Objective   /81   Pulse 93   Temp 36.6 °C (97.9 °F)   Ht 1.651 m (5' 5\")   Wt 140 kg (309 lb 8 oz)   SpO2 97%   BMI 51.50 kg/m²     Physical Exam  Vitals and nursing note reviewed.   Constitutional:       General: Bree Lamar \"Mal\" is not in acute distress.     Appearance: Normal appearance.   HENT:      Head: Normocephalic and atraumatic.   Eyes:      General: No scleral icterus.     Extraocular Movements: Extraocular movements intact.      " "Conjunctiva/sclera: Conjunctivae normal.   Pulmonary:      Effort: Pulmonary effort is normal. No respiratory distress.   Abdominal:      General: Bowel sounds are normal. There is no distension.      Palpations: Abdomen is soft. There is no mass.      Tenderness: There is no abdominal tenderness. There is no right CVA tenderness, left CVA tenderness, guarding or rebound.   Skin:     General: Skin is warm and dry.      Coloration: Skin is not jaundiced.   Neurological:      Mental Status: Bree Lamar \"Mal\" is alert and oriented to person, place, and time. Mental status is at baseline.   Psychiatric:         Behavior: Behavior normal.         Assessment/Plan   Problem List Items Addressed This Visit       Hyperlipidemia    Relevant Orders    Lipid Panel    Comprehensive Metabolic Panel    Creatine Kinase    Fibromyalgia    Relevant Medications    cyclobenzaprine (Flexeril) 10 mg tablet    rosuvastatin (Crestor) 10 mg tablet    LLQ abdominal pain - Primary     Suspect urolithiasis. Check KUB. May consider additional testing, if symptoms persist or worsen.         Relevant Orders    POCT UA Automated manually resulted (Completed)    POCT pregnancy, urine manually resulted (Completed)    CBC    Lipase    Lactate    Urine Culture     Other Visit Diagnoses       Abnormal result on screening urine test        Relevant Orders    Urine Culture                         "

## 2024-03-20 NOTE — PATIENT INSTRUCTIONS
Please return for a(n) medication follow-up appointment after tests to review results and options, earlier if any question or concern. Please return or seek medical attention if symptoms persist, change, worsen, or return. For emergencies, call 9-1-1 or go to the nearest Emergency Room.    Avoid taking Biotin for a week prior to any blood tests, as it can interfere with certain results. Fasting for labs means 12 hours, nothing to eat or drink, except water and medications, unless directed otherwise.    For assistance with scheduling referrals or consultations, please call [phone]. For laboratory, radiology, and other tests, please call [phone] ([phone] for pediatrics). Please review prescription inserts and published information for possible adverse effects of all medications. Return after testing or consultation to review results and recommendations, if symptoms persist, change, worsen, or return, or if you have any question or concern. If you do not get results within 7-10 days, or you have any question or concern, please send a message, call the office ([phone]), or return to the office for a follow-up appointment. For non-emergencies, you may call the office. For emergencies, call 9-1-1 or go to the nearest Emergency Department. Please schedule additional appointment(s) to address concern(s) not addressed today.    In general, results are not released or discussed over the telephone, but at an appointment or via  [employer]inMobi. Results of tests done through [facility] are released via  [employer] (see below).  [email or web address]   [employer] support line: [phone]

## 2024-03-21 LAB — BACTERIA UR CULT: NORMAL

## 2024-03-22 ENCOUNTER — TELEPHONE (OUTPATIENT)
Dept: PRIMARY CARE | Facility: CLINIC | Age: 34
End: 2024-03-22
Payer: MEDICARE

## 2024-03-22 DIAGNOSIS — R10.32 LEFT LOWER QUADRANT ABDOMINAL PAIN: Primary | ICD-10-CM

## 2024-03-22 NOTE — TELEPHONE ENCOUNTER
Result Communication    Resulted Orders   XR abdomen 1 view    Narrative    Interpreted By:  Elbert Lynch,   STUDY:  XR ABDOMEN 1 VIEW; ;  3/20/2024 1:36 pm      INDICATION:  Signs/Symptoms:KUB: colicky LLQ pain, remote h/o urolithiasis.      COMPARISON:  None.      ACCESSION NUMBER(S):  [id number]      ORDERING CLINICIAN:  AMANDA PINEDA      TECHNIQUE:  3 radiographs of the abdomen are performed.      FINDINGS:  There is increased stool in the rectosigmoid colon. The overall bowel  gas pattern is nonspecific. A 1.3 x 0.8 cm calcification lies to the  left of the L2 vertebral body. No additional calcifications are  detected. The visualized osseous structures are intact.        Impression    Nonspecific bowel-gas pattern.      1.3 x 0.8 cm calcification is identified to the left of the L2  vertebral body. In this clinical setting, obstructive uropathy is to  be excluded. This can be further assessed with dedicated CT  examination of the abdomen and pelvis.          MACRO:  None      Signed by: Elbert Lynch 3/21/2024 9:13 PM  Dictation workstation:   [id number]       10:03 AM      Results were successfully communicated with the patient and they acknowledged their understanding.

## 2024-03-23 ENCOUNTER — HOSPITAL ENCOUNTER (OUTPATIENT)
Dept: RADIOLOGY | Facility: HOSPITAL | Age: 34
Discharge: HOME | End: 2024-03-23
Payer: MEDICARE

## 2024-03-23 DIAGNOSIS — R10.32 LEFT LOWER QUADRANT ABDOMINAL PAIN: ICD-10-CM

## 2024-03-23 PROCEDURE — 74176 CT ABD & PELVIS W/O CONTRAST: CPT

## 2024-03-23 PROCEDURE — 74176 CT ABD & PELVIS W/O CONTRAST: CPT | Performed by: RADIOLOGY

## 2024-03-25 ENCOUNTER — TELEPHONE (OUTPATIENT)
Dept: PRIMARY CARE | Facility: CLINIC | Age: 34
End: 2024-03-25
Payer: MEDICARE

## 2024-03-25 DIAGNOSIS — N20.2 CALCULUS OF KIDNEY WITH CALCULUS OF URETER: Primary | ICD-10-CM

## 2024-03-25 LAB
CYTOLOGY CMNT CVX/VAG CYTO-IMP: NORMAL
HPV HR 12 DNA GENITAL QL NAA+PROBE: NEGATIVE
HPV HR GENOTYPES PNL CVX NAA+PROBE: NEGATIVE
HPV16 DNA SPEC QL NAA+PROBE: NEGATIVE
HPV18 DNA SPEC QL NAA+PROBE: NEGATIVE
LAB AP CONTRACEPTIVE HISTORY: NORMAL
LAB AP HPV GENOTYPE QUESTION: YES
LAB AP HPV HR: NORMAL
LABORATORY COMMENT REPORT: NORMAL
LMP START DATE: NORMAL
PATH REPORT.TOTAL CANCER: NORMAL

## 2024-03-25 NOTE — TELEPHONE ENCOUNTER
----- Message from Tom Springer DO sent at 3/25/2024  9:01 AM EDT -----  Please make sure patient is aware of the comments or MyChart message.  Your CT scan report describes 2 urinary stones, 1 of which is fairly large at 12 mm, is present in the left ureter, and appears to be obstructing the flow of urine.  Recommend follow-up with a urologist as soon as possible.     Recommend also rechecking your kidney numbers, now.    Urology  Dr. Angelique Kovacs, Dr. Kevin Staley ([place]) [phone]  [facility] Re[facility] ([place]) [phone]  Dr. Diaz, Dr. Lara, Dr. Pablo, Dr. Gerardo ([place]) [phone]  Dr. Xavier Rubin ([facility] [phone]  Dr. Damian Florence, Dr. Marcia Jeronimo, Dr. Gibran Puga, Dr. Lolis Weiss, et al. ([place]) [phone]  chepe Sylvester ([place]) [phone]    Gynecologic Urology  Dr. Julio Wu ([place]) [phone]  Dr. Ahsan Ba, Dr. Anna Ibanez ([place]) [phone]  Neema Pickering [facility] ([place]) [phone]

## 2024-03-25 NOTE — TELEPHONE ENCOUNTER
Pt has appt with Dr Lara Thursday in [facility] [address] @850AM xrays prior to appt. Informed additional lab order placed and pt should stay well hydrated.

## 2024-03-25 NOTE — TELEPHONE ENCOUNTER
Pt informed of CT results, appt Thurs 850 with Dr Lara, x rays first arrive at 8, informed lab order place to have done ASAP states can't have until tomorrow morning.

## 2024-03-26 ENCOUNTER — LAB (OUTPATIENT)
Dept: LAB | Facility: LAB | Age: 34
End: 2024-03-26
Payer: MEDICARE

## 2024-03-26 DIAGNOSIS — N20.2 CALCULUS OF KIDNEY WITH CALCULUS OF URETER: ICD-10-CM

## 2024-03-26 LAB
ANION GAP SERPL CALC-SCNC: 17 MMOL/L (ref 10–20)
BUN SERPL-MCNC: 20 MG/DL (ref 6–23)
CALCIUM SERPL-MCNC: 9.3 MG/DL (ref 8.6–10.6)
CHLORIDE SERPL-SCNC: 104 MMOL/L (ref 98–107)
CO2 SERPL-SCNC: 22 MMOL/L (ref 21–32)
CREAT SERPL-MCNC: 1.47 MG/DL (ref 0.5–1.3)
EGFRCR SERPLBLD CKD-EPI 2021: 48 ML/MIN/1.73M*2
GLUCOSE SERPL-MCNC: 89 MG/DL (ref 74–99)
POTASSIUM SERPL-SCNC: 4.3 MMOL/L (ref 3.5–5.3)
SODIUM SERPL-SCNC: 139 MMOL/L (ref 136–145)

## 2024-03-26 PROCEDURE — 36415 COLL VENOUS BLD VENIPUNCTURE: CPT

## 2024-03-26 PROCEDURE — 80048 BASIC METABOLIC PNL TOTAL CA: CPT

## 2024-03-27 ENCOUNTER — TELEPHONE (OUTPATIENT)
Dept: PRIMARY CARE | Facility: CLINIC | Age: 34
End: 2024-03-27
Payer: MEDICARE

## 2024-03-28 ENCOUNTER — HOSPITAL ENCOUNTER (OUTPATIENT)
Dept: RADIOLOGY | Facility: CLINIC | Age: 34
Discharge: HOME | End: 2024-03-28
Payer: MEDICARE

## 2024-03-28 DIAGNOSIS — N20.0 CALCULUS OF KIDNEY: ICD-10-CM

## 2024-03-28 PROCEDURE — 74018 RADEX ABDOMEN 1 VIEW: CPT

## 2024-03-28 PROCEDURE — 74018 RADEX ABDOMEN 1 VIEW: CPT | Performed by: RADIOLOGY

## 2024-04-03 NOTE — TELEPHONE ENCOUNTER
Results 03/27/2024 Spoke with pt per Dr Springer, informed to keep appt with specialist tomorrow, kidney function not any worse but not any better, make sure to avoid all nsaids pt stated oh no I better stop my meloxicam.

## 2024-05-03 RX ORDER — DROSPIRENONE 4 MG/1
TABLET, FILM COATED ORAL
COMMUNITY
Start: 2024-03-11 | End: 2024-05-06 | Stop reason: ALTCHOICE

## 2024-05-03 RX ORDER — ONDANSETRON 4 MG/1
TABLET, ORALLY DISINTEGRATING ORAL
COMMUNITY
Start: 2019-03-21 | End: 2024-05-06 | Stop reason: SDUPTHER

## 2024-05-03 RX ORDER — NORETHINDRONE ACETATE AND ETHINYL ESTRADIOL .03; 1.5 MG/1; MG/1
TABLET ORAL
COMMUNITY

## 2024-05-03 RX ORDER — LORAZEPAM 0.5 MG/1
0.5 TABLET ORAL DAILY PRN
COMMUNITY
Start: 2024-03-04

## 2024-05-06 ENCOUNTER — OFFICE VISIT (OUTPATIENT)
Dept: PRIMARY CARE | Facility: CLINIC | Age: 34
End: 2024-05-06
Payer: MEDICARE

## 2024-05-06 VITALS
DIASTOLIC BLOOD PRESSURE: 90 MMHG | HEART RATE: 116 BPM | BODY MASS INDEX: 48.82 KG/M2 | SYSTOLIC BLOOD PRESSURE: 140 MMHG | WEIGHT: 293 LBS | OXYGEN SATURATION: 96 % | HEIGHT: 65 IN

## 2024-05-06 DIAGNOSIS — G43.709 CHRONIC MIGRAINE WITHOUT AURA WITHOUT STATUS MIGRAINOSUS, NOT INTRACTABLE: Primary | ICD-10-CM

## 2024-05-06 DIAGNOSIS — R94.4 DECREASED GFR: ICD-10-CM

## 2024-05-06 DIAGNOSIS — K42.9 UMBILICAL HERNIA WITHOUT OBSTRUCTION AND WITHOUT GANGRENE: ICD-10-CM

## 2024-05-06 PROCEDURE — 3008F BODY MASS INDEX DOCD: CPT | Performed by: FAMILY MEDICINE

## 2024-05-06 PROCEDURE — 3080F DIAST BP >= 90 MM HG: CPT | Performed by: FAMILY MEDICINE

## 2024-05-06 PROCEDURE — 99214 OFFICE O/P EST MOD 30 MIN: CPT | Performed by: FAMILY MEDICINE

## 2024-05-06 PROCEDURE — 1036F TOBACCO NON-USER: CPT | Performed by: FAMILY MEDICINE

## 2024-05-06 PROCEDURE — 3077F SYST BP >= 140 MM HG: CPT | Performed by: FAMILY MEDICINE

## 2024-05-06 RX ORDER — DROSPIRENONE AND ETHINYL ESTRADIOL 0.02-3(28)
1 KIT ORAL DAILY
COMMUNITY

## 2024-05-06 ASSESSMENT — ENCOUNTER SYMPTOMS
OCCASIONAL FEELINGS OF UNSTEADINESS: 0
HEADACHES: 1
LOSS OF SENSATION IN FEET: 0
DEPRESSION: 0

## 2024-05-06 ASSESSMENT — PATIENT HEALTH QUESTIONNAIRE - PHQ9
1. LITTLE INTEREST OR PLEASURE IN DOING THINGS: NOT AT ALL
2. FEELING DOWN, DEPRESSED OR HOPELESS: NOT AT ALL
SUM OF ALL RESPONSES TO PHQ9 QUESTIONS 1 AND 2: 0

## 2024-05-06 NOTE — PATIENT INSTRUCTIONS
Try propping up the head of your bed. If symptoms persist despite taking the PPI, or if the PPI is not working out, please follow-up with a gastroenterologist:    Gastroenterology  Ina Bautista CNP ([place]) [phone]  Dr. Stephy Lazo, Dr. Rhoda Bishop, Dr. Abran Conde, Dr. Giovanny Robertson, Dr. Andrea Ko ([place]) [phone]    Proton Pump Inhibitors (PPI, e.g., Prilosec/omeprazole, Nexium/esomeprazole, Protonix/pantoprazole, and others) may cause vitamin or mineral deficiencies, kidney damage, dementia, stomach polyps, fractures and thinning of the bones (osteoporosis), intestinal infections including C. diff., lupus. Some of these side effects are more likely with chronic use. There are other possible side effects, and it is recommended, as with any medication, to review all possible side effects. Chronic PPI use may be necessary in certain situations (e.g., Newberry esophagus).     GFR (glomerular filtration rate, an estimate of kidney function) is moderately decreased. Recommend maintaining a healthy blood pressure, controlling or avoiding diabetes, staying adequately hydrated, and avoiding drugs potentially toxic to the kidneys (e.g., ibuprofen, naproxen, or other NSAIDs; Tylenol/acetaminophen is safe for the kidneys).     If your Creatinine is not back down to where it was in February 2024, please follow-up with a kidney specialist (nephrologist).    Nephrology  Dr. Veronica Miller ([place]) [phone]  Dr. Enrique Buenrostro ([place]) [phone]  Dr. Masood Quiros, Dr. Erick Hamm, Dr. Spring Lott, Dr. Nicole Marroquin, Dr. Joseph Bhardwaj ([place]) [phone]    Please return for a(n) kidney, medication follow-up appointment in 3 months, after tests to review results and options, earlier if any question or concern.  Please seek immediate medical attention if your umbilical hernia becomes symptomatic.    Avoid taking Biotin for a  week prior to any blood tests, as it can interfere with certain results. Fasting for labs means 12 hours, nothing to eat or drink, except water and medications, unless directed otherwise.    For assistance with scheduling referrals or consultations, please call [phone]. For laboratory, radiology, and other tests, please call [phone] ([phone] for pediatrics). Please review prescription inserts and published information for possible adverse effects of all medications. Return after testing or consultation to review results and recommendations, if symptoms persist, change, worsen, or return, or if you have any question or concern. If you do not get results within 7-10 days, or you have any question or concern, please send a message, call the office ([phone]), or return to the office for a follow-up appointment. For non-emergencies, you may call the office. For emergencies, call 9-1-1 or go to the nearest Emergency Department. Please schedule additional appointment(s) to address concern(s) not addressed today.    In general, results are not released or discussed over the telephone, but at an appointment or via  [email or web address]. Results of tests done through [facility] are released via  [email or web address] (see below).  [email or web address]   [email or web address] support line: [phone]

## 2024-05-06 NOTE — PROGRESS NOTES
"Subjective   Patient ID: Bree Lamar \"Mal\" is a 33 y.o. adult who presents for Follow-up (Pt presents for check up, c/o needling to discuss acid reflux, wants to discuss migraine medication no rx's. BL).  HPI Historian(s): Self    c/o acid reflux h/o hiatal hernia. Has trouble remembering to take PPI. Really bothers her at night when laying down. Does not prop up head of bed.    Can not recall any change between Feb and March except the kidney stone.    Review of Systems   Neurological:  Positive for headaches.   All other systems reviewed and are negative.        Objective   /90 Comment: no meds yet  Pulse (!) 116   Ht 1.651 m (5' 5\")   Wt 138 kg (303 lb 8 oz)   SpO2 96%   BMI 50.51 kg/m²     Physical Exam  Vitals and nursing note reviewed.   Constitutional:       General: Mal is not in acute distress.     Appearance: Normal appearance.      Comments: No assistive device presently being used.   HENT:      Head: Normocephalic and atraumatic.   Eyes:      General: No scleral icterus.     Extraocular Movements: Extraocular movements intact.      Conjunctiva/sclera: Conjunctivae normal.   Pulmonary:      Effort: Pulmonary effort is normal. No respiratory distress.   Abdominal:      Hernia: No hernia (umbilical hernia is not palpable) is present.   Skin:     General: Skin is warm and dry.      Coloration: Skin is not jaundiced.   Neurological:      Mental Status: Mal is alert and oriented to person, place, and time. Mental status is at baseline.   Psychiatric:         Behavior: Behavior normal.         Assessment/Plan   Problem List Items Addressed This Visit       Migraine - Primary     Well controlled. Continue Topiramate. Discuss with urology, and follow-up with neurology.         Decreased GFR     Likely secondary to obstructive uropathy. Recheck.         Relevant Orders    Albumin , Urine Random    Basic Metabolic Panel    Basic Metabolic Panel    Follow Up In Primary Care    Umbilical hernia " without obstruction and without gangrene     Incidental on CT. Asymptomatic and very small. Monitor for symptoms.

## 2024-05-07 ENCOUNTER — LAB (OUTPATIENT)
Dept: LAB | Facility: LAB | Age: 34
End: 2024-05-07
Payer: COMMERCIAL

## 2024-05-07 DIAGNOSIS — R94.4 DECREASED GFR: ICD-10-CM

## 2024-05-07 LAB
ANION GAP SERPL CALC-SCNC: 14 MMOL/L (ref 10–20)
BUN SERPL-MCNC: 14 MG/DL (ref 6–23)
CALCIUM SERPL-MCNC: 10.6 MG/DL (ref 8.6–10.6)
CHLORIDE SERPL-SCNC: 105 MMOL/L (ref 98–107)
CO2 SERPL-SCNC: 23 MMOL/L (ref 21–32)
CREAT SERPL-MCNC: 1.07 MG/DL (ref 0.5–1.3)
CREAT UR-MCNC: 15.2 MG/DL (ref 20–370)
EGFRCR SERPLBLD CKD-EPI 2021: 70 ML/MIN/1.73M*2
GLUCOSE SERPL-MCNC: 85 MG/DL (ref 74–99)
MICROALBUMIN UR-MCNC: <7 MG/L
MICROALBUMIN/CREAT UR: ABNORMAL MG/G{CREAT}
POTASSIUM SERPL-SCNC: 4.2 MMOL/L (ref 3.5–5.3)
SODIUM SERPL-SCNC: 138 MMOL/L (ref 136–145)

## 2024-05-07 PROCEDURE — 82570 ASSAY OF URINE CREATININE: CPT

## 2024-05-07 PROCEDURE — 36415 COLL VENOUS BLD VENIPUNCTURE: CPT

## 2024-05-07 PROCEDURE — 82043 UR ALBUMIN QUANTITATIVE: CPT

## 2024-05-07 PROCEDURE — 80048 BASIC METABOLIC PNL TOTAL CA: CPT

## 2024-05-09 ENCOUNTER — HOSPITAL ENCOUNTER (OUTPATIENT)
Dept: RADIOLOGY | Facility: CLINIC | Age: 34
Discharge: HOME | End: 2024-05-09
Payer: MEDICARE

## 2024-05-09 ENCOUNTER — LAB REQUISITION (OUTPATIENT)
Dept: LAB | Facility: LAB | Age: 34
End: 2024-05-09
Payer: MEDICARE

## 2024-05-09 DIAGNOSIS — N20.0 CALCULUS OF KIDNEY: ICD-10-CM

## 2024-05-09 PROCEDURE — 74018 RADEX ABDOMEN 1 VIEW: CPT

## 2024-05-09 PROCEDURE — 74018 RADEX ABDOMEN 1 VIEW: CPT | Performed by: RADIOLOGY

## 2024-05-09 PROCEDURE — 82365 CALCULUS SPECTROSCOPY: CPT

## 2024-05-13 LAB
APPEARANCE STONE: NORMAL
COMPN STONE: NORMAL
SPECIMEN WT: NORMAL MG

## 2024-05-19 DIAGNOSIS — M79.7 FIBROMYALGIA: ICD-10-CM

## 2024-05-20 RX ORDER — CYCLOBENZAPRINE HCL 10 MG
10 TABLET ORAL 3 TIMES DAILY PRN
Qty: 90 TABLET | Refills: 0 | Status: SHIPPED | OUTPATIENT
Start: 2024-05-20

## 2024-06-23 DIAGNOSIS — M79.7 FIBROMYALGIA: ICD-10-CM

## 2024-06-26 RX ORDER — CYCLOBENZAPRINE HCL 10 MG
10 TABLET ORAL 3 TIMES DAILY PRN
Qty: 90 TABLET | Refills: 0 | Status: SHIPPED | OUTPATIENT
Start: 2024-06-26

## 2024-07-28 DIAGNOSIS — M79.7 FIBROMYALGIA: ICD-10-CM

## 2024-08-03 RX ORDER — CYCLOBENZAPRINE HCL 10 MG
10 TABLET ORAL 3 TIMES DAILY PRN
Qty: 90 TABLET | Refills: 0 | Status: SHIPPED | OUTPATIENT
Start: 2024-08-03

## 2024-08-27 ENCOUNTER — LAB (OUTPATIENT)
Dept: LAB | Facility: LAB | Age: 34
End: 2024-08-27
Payer: MEDICARE

## 2024-08-27 DIAGNOSIS — R94.4 DECREASED GFR: ICD-10-CM

## 2024-08-27 LAB
ANION GAP SERPL CALC-SCNC: 17 MMOL/L (ref 10–20)
BUN SERPL-MCNC: 11 MG/DL (ref 6–23)
CALCIUM SERPL-MCNC: 9.7 MG/DL (ref 8.6–10.6)
CHLORIDE SERPL-SCNC: 105 MMOL/L (ref 98–107)
CO2 SERPL-SCNC: 22 MMOL/L (ref 21–32)
CREAT SERPL-MCNC: 0.88 MG/DL (ref 0.5–1.3)
EGFRCR SERPLBLD CKD-EPI 2021: 88 ML/MIN/1.73M*2
GLUCOSE SERPL-MCNC: 102 MG/DL (ref 74–99)
POTASSIUM SERPL-SCNC: 4.3 MMOL/L (ref 3.5–5.3)
SODIUM SERPL-SCNC: 140 MMOL/L (ref 136–145)

## 2024-08-27 PROCEDURE — 36415 COLL VENOUS BLD VENIPUNCTURE: CPT

## 2024-08-27 PROCEDURE — 80048 BASIC METABOLIC PNL TOTAL CA: CPT

## 2024-08-27 RX ORDER — TRIAMCINOLONE ACETONIDE 1 MG/G
OINTMENT TOPICAL
COMMUNITY
Start: 2024-04-24

## 2024-08-30 ENCOUNTER — APPOINTMENT (OUTPATIENT)
Dept: PRIMARY CARE | Facility: CLINIC | Age: 34
End: 2024-08-30
Payer: MEDICARE

## 2024-08-30 VITALS
HEART RATE: 95 BPM | HEIGHT: 65 IN | WEIGHT: 293 LBS | SYSTOLIC BLOOD PRESSURE: 137 MMHG | BODY MASS INDEX: 48.82 KG/M2 | DIASTOLIC BLOOD PRESSURE: 89 MMHG | OXYGEN SATURATION: 97 %

## 2024-08-30 DIAGNOSIS — G43.009 MIGRAINE WITHOUT AURA AND WITHOUT STATUS MIGRAINOSUS, NOT INTRACTABLE: ICD-10-CM

## 2024-08-30 DIAGNOSIS — G37.9 DEMYELINATING DISORDER (MULTI): ICD-10-CM

## 2024-08-30 DIAGNOSIS — M25.50 POLYARTHRALGIA: ICD-10-CM

## 2024-08-30 DIAGNOSIS — M79.7 FIBROMYALGIA: ICD-10-CM

## 2024-08-30 DIAGNOSIS — R94.4 DECREASED GFR: Primary | ICD-10-CM

## 2024-08-30 DIAGNOSIS — R53.82 CHRONIC FATIGUE: ICD-10-CM

## 2024-08-30 DIAGNOSIS — K30 FUNCTIONAL DYSPEPSIA: ICD-10-CM

## 2024-08-30 PROCEDURE — 3075F SYST BP GE 130 - 139MM HG: CPT | Performed by: FAMILY MEDICINE

## 2024-08-30 PROCEDURE — 99214 OFFICE O/P EST MOD 30 MIN: CPT | Performed by: FAMILY MEDICINE

## 2024-08-30 PROCEDURE — 3008F BODY MASS INDEX DOCD: CPT | Performed by: FAMILY MEDICINE

## 2024-08-30 PROCEDURE — 90673 RIV3 VACCINE NO PRESERV IM: CPT | Performed by: FAMILY MEDICINE

## 2024-08-30 PROCEDURE — 3079F DIAST BP 80-89 MM HG: CPT | Performed by: FAMILY MEDICINE

## 2024-08-30 PROCEDURE — G0008 ADMIN INFLUENZA VIRUS VAC: HCPCS | Performed by: FAMILY MEDICINE

## 2024-08-30 RX ORDER — PANTOPRAZOLE SODIUM 40 MG/1
40 TABLET, DELAYED RELEASE ORAL DAILY
Qty: 90 TABLET | Refills: 2 | Status: SHIPPED | OUTPATIENT
Start: 2024-08-30 | End: 2024-08-30 | Stop reason: SDUPTHER

## 2024-08-30 RX ORDER — ROSUVASTATIN CALCIUM 10 MG/1
10 TABLET, COATED ORAL NIGHTLY
Qty: 100 TABLET | Refills: 3 | Status: SHIPPED | OUTPATIENT
Start: 2024-08-30

## 2024-08-30 RX ORDER — TOPIRAMATE 50 MG/1
50 TABLET, FILM COATED ORAL 2 TIMES DAILY
Qty: 200 TABLET | Refills: 1 | Status: SHIPPED | OUTPATIENT
Start: 2024-08-30

## 2024-08-30 RX ORDER — PANTOPRAZOLE SODIUM 40 MG/1
40 TABLET, DELAYED RELEASE ORAL 2 TIMES DAILY
Qty: 60 TABLET | Refills: 2 | Status: SHIPPED | OUTPATIENT
Start: 2024-08-30 | End: 2024-11-28

## 2024-08-30 ASSESSMENT — ENCOUNTER SYMPTOMS
HEADACHES: 1
OCCASIONAL FEELINGS OF UNSTEADINESS: 0
LOSS OF SENSATION IN FEET: 0
ARTHRALGIAS: 1

## 2024-08-30 ASSESSMENT — PATIENT HEALTH QUESTIONNAIRE - PHQ9
1. LITTLE INTEREST OR PLEASURE IN DOING THINGS: NOT AT ALL
SUM OF ALL RESPONSES TO PHQ9 QUESTIONS 1 AND 2: 0
2. FEELING DOWN, DEPRESSED OR HOPELESS: NOT AT ALL

## 2024-08-30 NOTE — PATIENT INSTRUCTIONS
Ok to try slowly weaning down on Topamax.    Proton Pump Inhibitors (PPI, e.g., Prilosec/omeprazole, Nexium/esomeprazole, Protonix/pantoprazole, and others) may cause vitamin or mineral deficiencies, kidney damage, dementia, stomach polyps, fractures and thinning of the bones (osteoporosis), intestinal infections including C. diff., lupus. Some of these side effects are more likely with chronic use. There are other possible side effects, and it is recommended, as with any medication, to review all possible side effects. Chronic PPI use may be necessary in certain situations (e.g., Newberry esophagus).       Please return for a(n) kidney and medication follow-up appointment in 6 months, after tests to review results and options, earlier if any question or concern. Please schedule additional problem-focused appointment(s) to address additional problem(s).    Avoid taking Biotin (a vitamin, shows up particularly in hair/nail supplements) for a week prior to any blood tests, as it can interfere with certain results. Fasting for labs means 12 hours, nothing to eat or drink, except water and medications, unless directed otherwise.    For assistance with scheduling referrals or consultations, please call [phone]. For laboratory, radiology, and other tests, please call [phone] ([phone] for pediatrics). Please review prescription inserts and published information for possible adverse effects of all medications. Return after testing or consultation to review results and recommendations, if symptoms persist, change, worsen, or return, or if you have any question or concern. If you do not get results within 7-10 days, or you have any question or concern, please send a message, call the office ([phone]), or return to the office for a follow-up appointment. For non-emergencies, you may call the office. For emergencies, call 9-1-1 or go to the nearest Emergency Department. Please schedule additional  appointment(s) to address concern(s) not addressed today.    In general, results are not released or discussed over the telephone, but at an appointment or via  [employer]. Results of tests done through [facility] are released via  [employer] (see below).  [email or web address]   [employer] support line: [phone]

## 2024-08-30 NOTE — ASSESSMENT & PLAN NOTE
Improved with Pantoprazole BID. Ok to continue this, for now. Has not yet scheduled with gastroenterology. Advised doing so, and which she agrees to do.  Orders:    pantoprazole (ProtoNix) 40 mg EC tablet; Take 1 tablet (40 mg) by mouth 2 times a day. Do not crush, chew, or split.

## 2024-08-30 NOTE — ASSESSMENT & PLAN NOTE
Unclear history. She reports h/o normal EMG for her hands, symptoms there she reports are stable. May consider re-testing or neurology referral if any change or worsening.

## 2024-08-30 NOTE — PROGRESS NOTES
"Subjective   Patient ID: Bree Lamar \"Mal\" is a 33 y.o. adult who presents for Follow-up (Pt presents for F/U kidney function, pt wants to discuss migraine medication, c/o joint pain, rx's needed. BL ).  HPI Historian(s): Self    Generally feeling well.     Cut back on Topamax due to feeling foggy. Actually it helps significantly with her other pain. Taking 50 mg BID. Currently getting 1-2 migraines per month.    c/o fatigue, joint aches rather than muscle aches, \"always have inflammation on blood work.\" Reports she previously saw a rheumatology, and reports was simply referred to pain management.    Review of Systems   Musculoskeletal:  Positive for arthralgias.   Neurological:  Positive for headaches.   All other systems reviewed and are negative.      Patient Care Team:  Tom Springer DO as PCP - General (Family Medicine)  Marlyn Sewell MD as Referring Physician (Pain Medicine)  MAUREEN Farmer-CNP as Referring Physician (Internal Medicine)    Objective   /89   Pulse 95   Ht 1.651 m (5' 5\")   Wt 139 kg (307 lb)   SpO2 97%   BMI 51.09 kg/m²         Physical Exam  Vitals and nursing note reviewed.   Constitutional:       General: Mal is not in acute distress.     Appearance: Normal appearance.      Comments: No assistive device presently being used.   HENT:      Head: Normocephalic and atraumatic.   Eyes:      General: No scleral icterus.     Extraocular Movements: Extraocular movements intact.      Conjunctiva/sclera: Conjunctivae normal.   Pulmonary:      Effort: Pulmonary effort is normal. No respiratory distress.   Skin:     General: Skin is warm and dry.      Coloration: Skin is not jaundiced.   Neurological:      Mental Status: Mal is alert and oriented to person, place, and time. Mental status is at baseline.   Psychiatric:         Behavior: Behavior normal.         Assessment & Plan  Decreased GFR  Mild. Stable.  Orders:    Follow Up In Primary Care    Follow Up In " Primary Care; Future    Albumin-Creatinine Ratio, Urine Random; Future    Basic Metabolic Panel; Future    Functional dyspepsia  Improved with Pantoprazole BID. Ok to continue this, for now. Has not yet scheduled with gastroenterology. Advised doing so, and which she agrees to do.  Orders:    pantoprazole (ProtoNix) 40 mg EC tablet; Take 1 tablet (40 mg) by mouth 2 times a day. Do not crush, chew, or split.    Demyelinating disorder (Multi)  Unclear history. She reports h/o normal EMG for her hands, symptoms there she reports are stable. May consider re-testing or neurology referral if any change or worsening.       Migraine without aura and without status migrainosus, not intractable  1-2 per month. Ok to try weaning slowly down on Topamax, due to side effects.       Fibromyalgia    Orders:    topiramate (Topamax) 50 mg tablet; Take 1 tablet (50 mg) by mouth 2 times a day.    Referral to Rheumatology; Future    rosuvastatin (Crestor) 10 mg tablet; Take 1 tablet (10 mg) by mouth once daily at bedtime.    Polyarthralgia  Consult rheumatology.  Orders:    Referral to Rheumatology; Future    Chronic fatigue    Orders:    Referral to Rheumatology; Future

## 2024-08-30 NOTE — ASSESSMENT & PLAN NOTE
Orders:    topiramate (Topamax) 50 mg tablet; Take 1 tablet (50 mg) by mouth 2 times a day.    Referral to Rheumatology; Future    rosuvastatin (Crestor) 10 mg tablet; Take 1 tablet (10 mg) by mouth once daily at bedtime.

## 2024-08-30 NOTE — ASSESSMENT & PLAN NOTE
Mild. Stable.  Orders:    Follow Up In Primary Care    Follow Up In Primary Care; Future    Albumin-Creatinine Ratio, Urine Random; Future    Basic Metabolic Panel; Future

## 2024-09-15 DIAGNOSIS — M79.7 FIBROMYALGIA: ICD-10-CM

## 2024-09-16 RX ORDER — CYCLOBENZAPRINE HCL 10 MG
10 TABLET ORAL 3 TIMES DAILY PRN
Qty: 90 TABLET | Refills: 0 | Status: SHIPPED | OUTPATIENT
Start: 2024-09-16

## 2024-10-27 DIAGNOSIS — M79.7 FIBROMYALGIA: ICD-10-CM

## 2024-10-28 ENCOUNTER — TELEPHONE (OUTPATIENT)
Dept: PRIMARY CARE | Facility: CLINIC | Age: 34
End: 2024-10-28
Payer: MEDICARE

## 2024-10-28 DIAGNOSIS — M79.7 FIBROMYALGIA: ICD-10-CM

## 2024-10-28 RX ORDER — CYCLOBENZAPRINE HCL 10 MG
10 TABLET ORAL 3 TIMES DAILY PRN
Qty: 90 TABLET | Refills: 0 | Status: SHIPPED | OUTPATIENT
Start: 2024-10-28

## 2024-12-01 DIAGNOSIS — K30 FUNCTIONAL DYSPEPSIA: ICD-10-CM

## 2024-12-02 RX ORDER — PANTOPRAZOLE SODIUM 40 MG/1
TABLET, DELAYED RELEASE ORAL
Qty: 60 TABLET | Refills: 2 | Status: SHIPPED | OUTPATIENT
Start: 2024-12-02

## 2024-12-11 ENCOUNTER — OFFICE VISIT (OUTPATIENT)
Dept: RHEUMATOLOGY | Facility: CLINIC | Age: 34
End: 2024-12-11
Payer: MEDICARE

## 2024-12-11 VITALS — SYSTOLIC BLOOD PRESSURE: 130 MMHG | WEIGHT: 293 LBS | DIASTOLIC BLOOD PRESSURE: 80 MMHG | BODY MASS INDEX: 52.92 KG/M2

## 2024-12-11 DIAGNOSIS — M79.7 FIBROMYALGIA: Primary | ICD-10-CM

## 2024-12-11 PROCEDURE — 3079F DIAST BP 80-89 MM HG: CPT | Performed by: INTERNAL MEDICINE

## 2024-12-11 PROCEDURE — 99204 OFFICE O/P NEW MOD 45 MIN: CPT | Performed by: INTERNAL MEDICINE

## 2024-12-11 PROCEDURE — 99214 OFFICE O/P EST MOD 30 MIN: CPT | Performed by: INTERNAL MEDICINE

## 2024-12-11 PROCEDURE — 3075F SYST BP GE 130 - 139MM HG: CPT | Performed by: INTERNAL MEDICINE

## 2024-12-11 RX ORDER — BUSPIRONE HYDROCHLORIDE 15 MG/1
15 TABLET ORAL 2 TIMES DAILY
COMMUNITY

## 2024-12-11 NOTE — PROGRESS NOTES
"NP ref per Dr. Springer for evaluation and treatment of chronic pain.  C/O pain all over bilat hip / knee / elbow / hips x 10 years.   H/O FM  Eval with Pain Management ( [facility] )  X-ray knee / hips nl  IBU / Motrin otc with some relief.   H/O reflux so she tries not to take N-saids    9 min late for appt - was supposed to arrive 15 min early.  Came in with jodie donut cold beverage.    HPI - They are here with pain since age 14.  Has seen rheum a couple yrs ago \"she didn't really do anything\" and doesn't know dx.  They said it was a [facility] dr.  Per chart, they were dx with FM, and per chart, pt has had dx of FM and pain at least as far back as 2009.  Joints hurt.  Knees are the worst, and they also has back pain.  Hip pain when they lies on them.  Elbows hurt to put pressure on them.  Activity and cold make pain worse.    Topamax helps pain, but they had to decr dose d/t side-effects.  They had PT but had to stop d/t kidney stones, and they haven't been back.  No joint swelling.  They are always  in some kind of pain.  They are tender all over.  They went on disability because of LBP.  They saw pain mgmt who recommended PT, but the dr left practice.  Pt questions FM dx because the pain is in the joints.  Sleeps poorly.  They have never had sleep study.  No fever.  Has HA \"almost daily.\"  +dry mouth.  No mouth sores or raynauds.  Sees derm for \"dermatitis on my ft\" and given cream with relief but hasn't needed to use much.  No CP \"unless it's like gas pain.\"  +SOB.  +chronic nausea - was dx with IBS but hasn't been back to GI.  Hands and ft get numb.  Had hand EMG that was reportedly nl.  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  No CTD.  +FM  SH - former smoker and vaper.  No EtOH.  Occ marijauan gummies \"to help me sleep.\"  No street drugs    PE  Gen- NAD  HEENT - moist MM good salivary pooling (did drink a coffee drink)  Neck - supple no LAD  CV - RRR no r/m/g  Lungs - CTA  Abd - +BS  Extr - 2+ " "DP, PT, and rad pulses.  No c/c/e  Skin - no rash  Psych - nl affect  Neuro - nl strength.  Reflexes 2+ symmetric  Msk - no synovitis.  NT and no pain with ROM \"nothing out of the ordinary\"    A/P - Pt with chronic pain since age 14, partic knees and back, incr with activity  LORI and RF neg x mult.  Did have LORI 1:40/panel neg >10 yrs ago  Chronic mild incr ESR and CRP, sometimes associated with a sick visit, but this could be related to body habitus BMI 52.92  MRI L-spine shows mild-mod degen changes.  No evidence of inflam mentioned on read  MRI c-spine no evidence of inflammation  She has been dx with FM, confirmed by rheum.  I agree with this dx, d/w pt, and she had been told prior to appt that I do not treat FM/chronic pain  I will refer her to pain mgmt  She does not need to   " Mauc Instructions: By selecting yes to the question below the MAUC number will be added into the note.  This will be calculated automatically based on the diagnosis chosen, the size entered, the body zone selected (H,M,L) and the specific indications you chose. You will also have the option to override the Mohs AUC if you disagree with the automatically calculated number and this option is found in the Case Summary tab.

## 2024-12-16 DIAGNOSIS — M79.7 FIBROMYALGIA: ICD-10-CM

## 2024-12-17 RX ORDER — CYCLOBENZAPRINE HCL 10 MG
10 TABLET ORAL 3 TIMES DAILY PRN
Qty: 90 TABLET | Refills: 0 | Status: SHIPPED | OUTPATIENT
Start: 2024-12-17

## 2024-12-18 ENCOUNTER — LAB (OUTPATIENT)
Dept: LAB | Facility: LAB | Age: 34
End: 2024-12-18
Payer: MEDICARE

## 2024-12-19 ENCOUNTER — OFFICE VISIT (OUTPATIENT)
Dept: PRIMARY CARE | Facility: CLINIC | Age: 34
End: 2024-12-19
Payer: MEDICARE

## 2024-12-19 ENCOUNTER — TELEPHONE (OUTPATIENT)
Dept: PRIMARY CARE | Facility: CLINIC | Age: 34
End: 2024-12-19

## 2024-12-19 ENCOUNTER — LAB (OUTPATIENT)
Dept: LAB | Facility: LAB | Age: 34
End: 2024-12-19
Payer: MEDICARE

## 2024-12-19 ENCOUNTER — HOSPITAL ENCOUNTER (OUTPATIENT)
Dept: RADIOLOGY | Facility: HOSPITAL | Age: 34
Discharge: HOME | End: 2024-12-19
Payer: MEDICARE

## 2024-12-19 VITALS
BODY MASS INDEX: 52.42 KG/M2 | WEIGHT: 293 LBS | HEART RATE: 102 BPM | DIASTOLIC BLOOD PRESSURE: 82 MMHG | SYSTOLIC BLOOD PRESSURE: 130 MMHG | OXYGEN SATURATION: 97 % | TEMPERATURE: 98.1 F

## 2024-12-19 DIAGNOSIS — R10.32 LLQ ABDOMINAL PAIN: ICD-10-CM

## 2024-12-19 DIAGNOSIS — N20.2 CALCULUS OF KIDNEY WITH CALCULUS OF URETER: Primary | ICD-10-CM

## 2024-12-19 DIAGNOSIS — R10.32 LLQ ABDOMINAL PAIN: Primary | ICD-10-CM

## 2024-12-19 LAB
ALBUMIN SERPL BCP-MCNC: 4.1 G/DL (ref 3.4–5)
ALP SERPL-CCNC: 104 U/L (ref 33–120)
ALT SERPL W P-5'-P-CCNC: 15 U/L (ref 7–52)
ANION GAP SERPL CALC-SCNC: 16 MMOL/L (ref 10–20)
AST SERPL W P-5'-P-CCNC: 14 U/L (ref 9–39)
BILIRUB SERPL-MCNC: 0.4 MG/DL (ref 0–1.2)
BUN SERPL-MCNC: 11 MG/DL (ref 6–23)
CALCIUM SERPL-MCNC: 9.3 MG/DL (ref 8.6–10.3)
CHLORIDE SERPL-SCNC: 104 MMOL/L (ref 98–107)
CO2 SERPL-SCNC: 19 MMOL/L (ref 21–32)
CREAT SERPL-MCNC: 0.94 MG/DL (ref 0.5–1.3)
EGFRCR SERPLBLD CKD-EPI 2021: 81 ML/MIN/1.73M*2
ERYTHROCYTE [DISTWIDTH] IN BLOOD BY AUTOMATED COUNT: 13.8 % (ref 11.5–14.5)
GLUCOSE SERPL-MCNC: 105 MG/DL (ref 74–99)
HCT VFR BLD AUTO: 40 % (ref 36–52)
HGB BLD-MCNC: 12.6 G/DL (ref 12–17.5)
LACTATE SERPL-SCNC: 2.1 MMOL/L (ref 0.4–2)
MCH RBC QN AUTO: 27.3 PG (ref 26–34)
MCHC RBC AUTO-ENTMCNC: 31.5 G/DL (ref 32–36)
MCV RBC AUTO: 87 FL (ref 80–100)
NRBC BLD-RTO: 0 /100 WBCS (ref 0–0)
PLATELET # BLD AUTO: 393 X10*3/UL (ref 150–450)
POC APPEARANCE, URINE: CLEAR
POC BILIRUBIN, URINE: NEGATIVE
POC BLOOD, URINE: ABNORMAL
POC COLOR, URINE: YELLOW
POC GLUCOSE, URINE: NEGATIVE MG/DL
POC KETONES, URINE: NEGATIVE MG/DL
POC LEUKOCYTES, URINE: ABNORMAL
POC NITRITE,URINE: NEGATIVE
POC PH, URINE: 5.5 PH
POC PROTEIN, URINE: NEGATIVE MG/DL
POC SPECIFIC GRAVITY, URINE: <=1.005
POC UROBILINOGEN, URINE: 0.2 EU/DL
POTASSIUM SERPL-SCNC: 4.1 MMOL/L (ref 3.5–5.3)
PREGNANCY TEST URINE, POC: NEGATIVE
PROT SERPL-MCNC: 7.1 G/DL (ref 6.4–8.2)
RBC # BLD AUTO: 4.61 X10*6/UL (ref 4–5.9)
SODIUM SERPL-SCNC: 135 MMOL/L (ref 136–145)
WBC # BLD AUTO: 10.6 X10*3/UL (ref 4.4–11.3)

## 2024-12-19 PROCEDURE — 74176 CT ABD & PELVIS W/O CONTRAST: CPT

## 2024-12-19 PROCEDURE — 3075F SYST BP GE 130 - 139MM HG: CPT | Performed by: FAMILY MEDICINE

## 2024-12-19 PROCEDURE — 85027 COMPLETE CBC AUTOMATED: CPT

## 2024-12-19 PROCEDURE — 80053 COMPREHEN METABOLIC PANEL: CPT

## 2024-12-19 PROCEDURE — 83605 ASSAY OF LACTIC ACID: CPT

## 2024-12-19 PROCEDURE — 81003 URINALYSIS AUTO W/O SCOPE: CPT | Performed by: FAMILY MEDICINE

## 2024-12-19 PROCEDURE — 36415 COLL VENOUS BLD VENIPUNCTURE: CPT

## 2024-12-19 PROCEDURE — 74176 CT ABD & PELVIS W/O CONTRAST: CPT | Performed by: RADIOLOGY

## 2024-12-19 PROCEDURE — 99214 OFFICE O/P EST MOD 30 MIN: CPT | Performed by: FAMILY MEDICINE

## 2024-12-19 PROCEDURE — 81025 URINE PREGNANCY TEST: CPT | Performed by: FAMILY MEDICINE

## 2024-12-19 PROCEDURE — 3079F DIAST BP 80-89 MM HG: CPT | Performed by: FAMILY MEDICINE

## 2024-12-19 PROCEDURE — 2550000001 HC RX 255 CONTRASTS: Performed by: FAMILY MEDICINE

## 2024-12-19 PROCEDURE — 87086 URINE CULTURE/COLONY COUNT: CPT

## 2024-12-19 RX ORDER — DIATRIZOATE MEGLUMINE AND DIATRIZOATE SODIUM 660; 100 MG/ML; MG/ML
30 SOLUTION ORAL; RECTAL ONCE
Status: COMPLETED | OUTPATIENT
Start: 2024-12-19 | End: 2024-12-19

## 2024-12-19 RX ORDER — TAMSULOSIN HYDROCHLORIDE 0.4 MG/1
0.4 CAPSULE ORAL DAILY
Qty: 30 CAPSULE | Refills: 0 | Status: SHIPPED | OUTPATIENT
Start: 2024-12-19

## 2024-12-19 ASSESSMENT — ENCOUNTER SYMPTOMS: ABDOMINAL PAIN: 1

## 2024-12-19 NOTE — PATIENT INSTRUCTIONS
Recommend follow-up with your urologist.    Please return or seek medical attention if symptoms persist, change, worsen, or return. For emergencies, call 9-1-1 or go to the nearest Emergency Room. Please schedule additional problem-focused appointment(s) to address additional problem(s).    Avoid taking Biotin (a vitamin, shows up particularly in hair/nail supplements) for a week prior to any blood tests, as it can interfere with certain results. Fasting for labs means 12 hours, nothing to eat or drink, except water and medications, unless directed otherwise.    For assistance with scheduling referrals or consultations, please call [phone]. For laboratory, radiology, and other tests, please call [phone] ([phone] for pediatrics). Please review prescription inserts and published information for possible adverse effects of all medications. Return after testing or consultation to review results and recommendations, if symptoms persist, change, worsen, or return, or if you have any question or concern. If you do not get results within 7-10 days, or you have any question or concern, please send a message, call the office ([phone]), or return to the office for a follow-up appointment. For non-emergencies, you may call the office. For emergencies, call 9-1-1 or go to the nearest Emergency Department. Please schedule additional appointment(s) to address concern(s) not addressed today. An annual Wellness visit is strongly recommended. A Wellness visit should be dedicated to addressing routine health maintenance matters (e.g., cancer screenings, cardiovascular screening, etc.). Problem-focused visits, typically prompted by symptoms or specific concerns, are usually conducted separately, particularly if multiple or complex problems need to be addressed.    In general, results are not released or discussed over the telephone, but at an appointment or via  [facility]. Results of tests done through [facility] are released via  [employer] (see below).  [email or web address]   [employer] support line: [phone]

## 2024-12-19 NOTE — TELEPHONE ENCOUNTER
Pt informed of ct result and need to contact her urologist asap as the stones are too big to pass.  Pt aware Dr DREW called in flomax.  Pt will call if any problems scheduling appt.

## 2024-12-19 NOTE — PROGRESS NOTES
"Subjective   Patient ID: Bree Lamar \"Mal\" is a 34 y.o. adult h/o nephrolithiasis, s/p lithotripsy April 2024, who presents for Abdominal Pain (Pt presents c/o possible kidney stone, LL abdomin, states \"acute onset day after Thanksgiving\" intermittent tend since UTI symptoms just prior.).  Abdominal Pain     Historian(s): Self    Started 3w ago. Preceded by urinary frequency, urgency, dysuria. Pain is LLQ radiates a little toward the side and back. Tried Motrin, Tylenol, helps, last took a few days ago.    Denies fever, chills, sweats, NVD.    Review of Systems   Gastrointestinal:  Positive for abdominal pain.   All other systems reviewed and are negative.    No LMP recorded. Patient is premenarcheal.    Patient Care Team:  Tom Springer DO as PCP - General (Family Medicine)  Marlyn Sewell MD as Referring Physician (Pain Medicine)  MAUREEN Farmer-CNP as Referring Physician (Internal Medicine)  Nahed Lara MD as Consulting Physician (Urology)    Current Outpatient Medications   Medication Instructions    albuterol (ProAir HFA) 90 mcg/actuation inhaler 2 puffs, inhalation, Every 6 hours PRN    busPIRone (BUSPAR) 15 mg, 2 times daily    carvedilol (COREG) 12.5 mg, oral, 2 times daily    cetirizine (ZyrTEC) 10 mg tablet 1 tablet, 2 times daily    cholecalciferol (Vitamin D-3) 5,000 Units tablet 1 tablet, Daily    cyclobenzaprine (FLEXERIL) 10 mg, oral, 3 times daily PRN    DULoxetine (CYMBALTA) 60 mg, Daily RT    DULoxetine (CYMBALTA) 30 mg, Daily RT    hydrOXYzine pamoate (VISTARIL) 50 mg, 3 times daily PRN    LORazepam (ATIVAN) 0.5 mg, Daily PRN    norethindrone ac-eth estradioL (Loestrin) 1.5-30 mg-mcg tablet tablet     ondansetron (ZOFRAN) 4 mg, oral, Every 8 hours PRN, PRN    pantoprazole (ProtoNix) 40 mg EC tablet TAKE 1 TABLET BY MOUTH TWICE DAILY ::DO NOT CRUSH, CHEW OR SPLIT::    rosuvastatin (CRESTOR) 10 mg, oral, Nightly    SUMAtriptan (IMITREX) 100 mg, oral, Once as needed, " PRN    tamsulosin (FLOMAX) 0.4 mg, oral, Daily    topiramate (TOPAMAX) 50 mg, oral, 2 times daily    traZODone (Desyrel) 100 mg tablet 1 tablet, Nightly    triamcinolone (Kenalog) 0.1 % ointment APPLY OINTMENT TOPICALLY TO AFFECTED AREA ON FEET TWICE DAILY FOR 14 DAYS. STOP FOR 7 DAYS, THEN CONTINUE AS NEEDED. AVOID FACE, ARMPITS AND GROIN.       Objective   /82   Pulse 102   Temp 36.7 °C (98.1 °F)   Wt 143 kg (315 lb)   SpO2 97%   BMI 52.42 kg/m²           Physical Exam  Vitals and nursing note reviewed.   Constitutional:       General: Mal is not in acute distress.     Appearance: Normal appearance.   HENT:      Head: Normocephalic and atraumatic.   Eyes:      General: No scleral icterus.     Extraocular Movements: Extraocular movements intact.      Conjunctiva/sclera: Conjunctivae normal.   Pulmonary:      Effort: Pulmonary effort is normal. No respiratory distress.   Abdominal:      General: Bowel sounds are normal. There is no distension.      Palpations: Abdomen is soft. There is no hepatomegaly, splenomegaly or mass.      Tenderness: There is no abdominal tenderness. There is no right CVA tenderness, left CVA tenderness, guarding or rebound.   Skin:     General: Skin is warm and dry.      Coloration: Skin is not jaundiced.   Neurological:      Mental Status: Mal is alert and oriented to person, place, and time. Mental status is at baseline.   Psychiatric:         Behavior: Behavior normal.         Assessment & Plan  LLQ abdominal pain  CT, labs, follow-up with urology. If she is unable to provide a urine sample now, order for labs and check serum hCG in case she does not provide a urine sample.  Orders:    Comprehensive Metabolic Panel; Future    CBC; Future    Lactate; Future    POCT pregnancy, urine manually resulted    POCT UA Automated manually resulted    Urine Culture

## 2024-12-19 NOTE — TELEPHONE ENCOUNTER
----- Message from Tom Springer sent at 12/19/2024 12:50 PM EST -----  Please make sure patient is aware of the comments or [employer] message.    Your CT report describes multiple stones in the left ureter, without a backup of urine.  Recommend follow-up as soon as you can with your urologist, since the stones are large enough that they we will probably not pass on their own.  A prescription for Flomax/tamsulosin has been sent to your pharmacy, since this can sometimes help with the pain.  Please seek immediate medical attention, if symptoms change or worsen, e.g., pain, fever, decrease in urine output, swelling in the legs, shortness of breath, etc.

## 2024-12-19 NOTE — ASSESSMENT & PLAN NOTE
CT, labs, follow-up with urology. If she is unable to provide a urine sample now, order for labs and check serum hCG in case she does not provide a urine sample.  Orders:    Comprehensive Metabolic Panel; Future    CBC; Future    Lactate; Future    POCT pregnancy, urine manually resulted    POCT UA Automated manually resulted    Urine Culture

## 2024-12-20 ENCOUNTER — TELEPHONE (OUTPATIENT)
Dept: PRIMARY CARE | Facility: CLINIC | Age: 34
End: 2024-12-20
Payer: MEDICARE

## 2024-12-20 DIAGNOSIS — N20.2 CALCULUS OF KIDNEY WITH CALCULUS OF URETER: ICD-10-CM

## 2024-12-20 DIAGNOSIS — R79.89 HIGH SERUM LACTATE: Primary | ICD-10-CM

## 2024-12-20 LAB — BACTERIA UR CULT: NORMAL

## 2024-12-20 NOTE — TELEPHONE ENCOUNTER
----- Message from Tom Springer sent at 12/19/2024 12:50 PM EST -----  Please make sure patient is aware of the comments or [employer] message.    Your CT report describes multiple stones in the left ureter, without a backup of urine.  Recommend follow-up as soon as you can with your urologist, since the stones are large enough that they we will probably not pass on their own.  A prescription for Flomax/tamsulosin has been sent to your pharmacy, since this can sometimes help with the pain.  Please seek immediate medical attention, if symptoms change or worsen, e.g., pain, fever, decrease in urine output, swelling in the legs, shortness of breath, etc.

## 2024-12-20 NOTE — TELEPHONE ENCOUNTER
Result Communication    Resulted Orders   Comprehensive Metabolic Panel   Result Value Ref Range    Glucose 105 (H) 74 - 99 mg/dL    Sodium 135 (L) 136 - 145 mmol/L    Potassium 4.1 3.5 - 5.3 mmol/L    Chloride 104 98 - 107 mmol/L    Bicarbonate 19 (L) 21 - 32 mmol/L    Anion Gap 16 10 - 20 mmol/L    Urea Nitrogen 11 6 - 23 mg/dL    Creatinine 0.94 0.50 - 1.30 mg/dL    eGFR 81 >60 mL/min/1.73m*2      Comment:      Calculations of estimated GFR are performed using the 2021 CKD-EPI Study Refit equation without the race variable for the IDMS-Traceable creatinine methods.  https://jasn.asnjournals.org/content/early/2021/09/22/ASN.4296351788    Calcium 9.3 8.6 - 10.3 mg/dL    Albumin 4.1 3.4 - 5.0 g/dL    Alkaline Phosphatase 104 33 - 120 U/L    Total Protein 7.1 6.4 - 8.2 g/dL    AST 14 9 - 39 U/L    Bilirubin, Total 0.4 0.0 - 1.2 mg/dL    ALT 15 7 - 52 U/L      Comment:      Patients treated with Sulfasalazine may generate falsely decreased results for ALT.   CBC   Result Value Ref Range    WBC 10.6 4.4 - 11.3 x10*3/uL    nRBC 0.0 0.0 - 0.0 /100 WBCs    RBC 4.61 4.00 - 5.90 x10*6/uL    Hemoglobin 12.6 12.0 - 17.5 g/dL    Hematocrit 40.0 36.0 - 52.0 %    MCV 87 80 - 100 fL    MCH 27.3 26.0 - 34.0 pg    MCHC 31.5 (L) 32.0 - 36.0 g/dL    RDW 13.8 11.5 - 14.5 %    Platelets 393 150 - 450 x10*3/uL   Lactate   Result Value Ref Range    Lactate 2.1 (H) 0.4 - 2.0 mmol/L    Narrative    Venipuncture immediately after or during the administration of Metamizole may lead to falsely low results. Testing should be performed immediately prior to Metamizole dosing.       10:45 AM      Results were successfully communicated with the patient and they acknowledged their understanding. Pt will have redrawn tomorrow AM

## 2024-12-20 NOTE — TELEPHONE ENCOUNTER
----- Message from Tom Springer sent at 12/20/2024  8:32 AM EST -----  Please make sure patient is aware of the comments or MyChart message.    Your lactate is slightly elevated.  Recommend rechecking now.  Please be sure to stay well-hydrated.  Please seek immediate medical attention/call 911/go to the ER if symptoms change or worsen in any way.

## 2024-12-20 NOTE — TELEPHONE ENCOUNTER
Result Communication    Resulted Orders   CT abdomen and pelvis w oral contrast only    Narrative    Interpreted By:  Sue Freeman,   STUDY:  CT ABDOMEN AND PELVIS W ORAL CONTRAST ONLY;  12/19/2024 10:43 am      INDICATION:  Signs/Symptoms:LLQ abdominal pain.      COMPARISON:  03/23/2024      ACCESSION NUMBER(S):  [id number]      ORDERING CLINICIAN:  AMANDA PINEDA      TECHNIQUE:  CT of the abdomen and pelvis was performed without IV contrast.  Sagittal and coronal reconstructions.      FINDINGS:  Limited evaluation for solid organs and vasculature without  intravenous contrast.      Lower Chest: Clear.      Liver: Enlargement of the left lobe without lisa surface nodularity.      Gallbladder and Biliary: Unremarkable.      Pancreas: No abnormality identified in the pancreas.      Spleen: No abnormality identified in the spleen.      Adrenals: No abnormality identified in either adrenal gland.      Urinary: Multiple nonobstructive calculi in left kidney mid zone and  lower pole measuring between 2 mm and 6 mm. Lobulated kidneys  bilaterally. There are multiple calculi in the left ureter, in the  mid aspect measuring 7 mm and in the distal aspect measuring 6 mm and  7 mm respectively (2 adjacent calculi), approximately 1 cm from the  UVJ. No hydronephrosis.      Gastrointestinal/Peritoneum: No small or large bowel obstruction in  the visualized abdomen. In the abdomen, there is no extraluminal air.  No significant free fluid. Distended stomach. Oral contrast was  administered and extends into the proximal small bowel loops.      Vascular: Abdominal aorta is normal in caliber.      Lymphatics: No enlarged lymph nodes by size criteria.      MSK/Body Wall: No aggressive bony lesion identified. Small fat  containing umbilical hernia.        Impression    Multiple left ureteral calculi without associated hydronephrosis.      Multiple nonobstructive left renal calculi.      Signed by: Sue Freeman 12/19/2024  12:22 PM  Dictation workstation:   [id number]       12:48 PM      Results were successfully communicated with the patient and they acknowledged their understanding.

## 2024-12-21 ENCOUNTER — LAB (OUTPATIENT)
Dept: LAB | Facility: LAB | Age: 34
End: 2024-12-21
Payer: MEDICARE

## 2024-12-21 DIAGNOSIS — N20.2 CALCULUS OF KIDNEY WITH CALCULUS OF URETER: ICD-10-CM

## 2024-12-21 DIAGNOSIS — R79.89 HIGH SERUM LACTATE: ICD-10-CM

## 2024-12-21 LAB
ERYTHROCYTE [DISTWIDTH] IN BLOOD BY AUTOMATED COUNT: 13.9 % (ref 11.5–14.5)
HCT VFR BLD AUTO: 39.9 % (ref 36–52)
HGB BLD-MCNC: 12.7 G/DL (ref 12–17.5)
LACTATE SERPL-SCNC: 1.7 MMOL/L (ref 0.4–2)
MCH RBC QN AUTO: 27.3 PG (ref 26–34)
MCHC RBC AUTO-ENTMCNC: 31.8 G/DL (ref 32–36)
MCV RBC AUTO: 86 FL (ref 80–100)
NRBC BLD-RTO: 0 /100 WBCS (ref 0–0)
PLATELET # BLD AUTO: 437 X10*3/UL (ref 150–450)
RBC # BLD AUTO: 4.66 X10*6/UL (ref 4–5.9)
WBC # BLD AUTO: 9.7 X10*3/UL (ref 4.4–11.3)

## 2024-12-21 PROCEDURE — 36415 COLL VENOUS BLD VENIPUNCTURE: CPT

## 2024-12-21 PROCEDURE — 83605 ASSAY OF LACTIC ACID: CPT

## 2024-12-21 PROCEDURE — 85027 COMPLETE CBC AUTOMATED: CPT

## 2024-12-24 DIAGNOSIS — K30 FUNCTIONAL DYSPEPSIA: ICD-10-CM

## 2024-12-24 RX ORDER — CARVEDILOL 12.5 MG/1
12.5 TABLET ORAL 2 TIMES DAILY
Qty: 60 TABLET | Refills: 11 | Status: SHIPPED | OUTPATIENT
Start: 2024-12-24

## 2025-01-02 ENCOUNTER — HOSPITAL ENCOUNTER (OUTPATIENT)
Dept: RADIOLOGY | Facility: HOSPITAL | Age: 35
Discharge: HOME | End: 2025-01-02
Payer: MEDICARE

## 2025-01-02 DIAGNOSIS — N20.0 CALCULUS OF KIDNEY: ICD-10-CM

## 2025-01-02 PROCEDURE — 74018 RADEX ABDOMEN 1 VIEW: CPT

## 2025-01-07 DIAGNOSIS — N20.2 CALCULUS OF KIDNEY WITH CALCULUS OF URETER: ICD-10-CM

## 2025-01-08 RX ORDER — TAMSULOSIN HYDROCHLORIDE 0.4 MG/1
0.4 CAPSULE ORAL DAILY
Qty: 30 CAPSULE | Refills: 0 | Status: SHIPPED | OUTPATIENT
Start: 2025-01-08

## 2025-01-08 NOTE — TELEPHONE ENCOUNTER
Last OV 12/19/24, next appt 2/28/25   Pt here as new patient to establish care.   Pt wanting to discuss weight and irregular period.

## 2025-02-04 DIAGNOSIS — M79.7 FIBROMYALGIA: ICD-10-CM

## 2025-02-07 RX ORDER — CYCLOBENZAPRINE HCL 10 MG
10 TABLET ORAL 3 TIMES DAILY PRN
Qty: 90 TABLET | Refills: 0 | Status: SHIPPED | OUTPATIENT
Start: 2025-02-07

## 2025-02-11 DIAGNOSIS — N20.2 CALCULUS OF KIDNEY WITH CALCULUS OF URETER: ICD-10-CM

## 2025-02-11 RX ORDER — TAMSULOSIN HYDROCHLORIDE 0.4 MG/1
0.4 CAPSULE ORAL DAILY
Qty: 30 CAPSULE | Refills: 0 | Status: SHIPPED | OUTPATIENT
Start: 2025-02-11

## 2025-02-12 DIAGNOSIS — N20.0 STONE, KIDNEY: Primary | ICD-10-CM

## 2025-02-14 DIAGNOSIS — R60.0 BILATERAL LEG EDEMA: ICD-10-CM

## 2025-02-17 RX ORDER — ONDANSETRON 4 MG/1
4 TABLET, FILM COATED ORAL EVERY 8 HOURS PRN
Qty: 20 TABLET | Refills: 0 | Status: SHIPPED | OUTPATIENT
Start: 2025-02-17

## 2025-02-19 ENCOUNTER — PRE-ADMISSION TESTING (OUTPATIENT)
Dept: PREADMISSION TESTING | Facility: HOSPITAL | Age: 35
End: 2025-02-19
Payer: MEDICARE

## 2025-02-19 ENCOUNTER — APPOINTMENT (OUTPATIENT)
Dept: PREADMISSION TESTING | Facility: HOSPITAL | Age: 35
End: 2025-02-19
Payer: MEDICARE

## 2025-02-25 ENCOUNTER — PRE-ADMISSION TESTING (OUTPATIENT)
Dept: PREADMISSION TESTING | Facility: HOSPITAL | Age: 35
End: 2025-02-25
Payer: MEDICARE

## 2025-02-25 VITALS
HEART RATE: 103 BPM | WEIGHT: 293 LBS | DIASTOLIC BLOOD PRESSURE: 101 MMHG | SYSTOLIC BLOOD PRESSURE: 154 MMHG | BODY MASS INDEX: 48.82 KG/M2 | TEMPERATURE: 98.6 F | OXYGEN SATURATION: 100 % | HEIGHT: 65 IN

## 2025-02-25 DIAGNOSIS — Z01.818 PRE-OP EXAMINATION: Primary | ICD-10-CM

## 2025-02-25 PROCEDURE — 99204 OFFICE O/P NEW MOD 45 MIN: CPT

## 2025-02-25 PROCEDURE — 93005 ELECTROCARDIOGRAM TRACING: CPT

## 2025-02-25 PROCEDURE — 93010 ELECTROCARDIOGRAM REPORT: CPT | Performed by: INTERNAL MEDICINE

## 2025-02-25 RX ORDER — DROSPIRENONE AND ETHINYL ESTRADIOL TABLETS 0.02-3(28)
KIT ORAL DAILY
COMMUNITY
Start: 2025-02-17

## 2025-02-25 ASSESSMENT — DUKE ACTIVITY SCORE INDEX (DASI)
CAN YOU WALK INDOORS, SUCH AS AROUND YOUR HOUSE: YES
CAN YOU PARTICIPATE IN STRENOUS SPORTS LIKE SWIMMING, SINGLES TENNIS, FOOTBALL, BASKETBALL, OR SKIING: NO
CAN YOU DO HEAVY WORK AROUND THE HOUSE LIKE SCRUBBING FLOORS OR LIFTING AND MOVING HEAVY FURNITURE: NO
CAN YOU TAKE CARE OF YOURSELF (EAT, DRESS, BATHE, OR USE TOILET): YES
CAN YOU PARTICIPATE IN MODERATE RECREATIONAL ACTIVITIES LIKE GOLF, BOWLING, DANCING, DOUBLES TENNIS OR THROWING A BASEBALL OR FOOTBALL: NO
CAN YOU HAVE SEXUAL RELATIONS: YES
CAN YOU DO MODERATE WORK AROUND THE HOUSE LIKE VACUUMING, SWEEPING FLOORS OR CARRYING GROCERIES: YES
TOTAL_SCORE: 24.2
CAN YOU DO LIGHT WORK AROUND THE HOUSE LIKE DUSTING OR WASHING DISHES: YES
CAN YOU CLIMB A FLIGHT OF STAIRS OR WALK UP A HILL: YES
CAN YOU RUN A SHORT DISTANCE: NO
CAN YOU WALK A BLOCK OR TWO ON LEVEL GROUND: YES
CAN YOU DO YARD WORK LIKE RAKING LEAVES, WEEDING OR PUSHING A MOWER: NO
DASI METS SCORE: 5.7

## 2025-02-25 ASSESSMENT — ENCOUNTER SYMPTOMS
RESPIRATORY NEGATIVE: 1
NEUROLOGICAL NEGATIVE: 1
DIARRHEA: 1
BACK PAIN: 1
EYES NEGATIVE: 1
CONSTIPATION: 1
ALLERGIC/IMMUNOLOGIC NEGATIVE: 1
PSYCHIATRIC NEGATIVE: 1
CONSTITUTIONAL NEGATIVE: 1
HEMATOLOGIC/LYMPHATIC NEGATIVE: 1
ENDOCRINE NEGATIVE: 1
ABDOMINAL PAIN: 1
CARDIOVASCULAR NEGATIVE: 1

## 2025-02-25 ASSESSMENT — PAIN - FUNCTIONAL ASSESSMENT: PAIN_FUNCTIONAL_ASSESSMENT: 0-10

## 2025-02-25 ASSESSMENT — PAIN SCALES - GENERAL: PAINLEVEL_OUTOF10: 4

## 2025-02-25 ASSESSMENT — PAIN DESCRIPTION - DESCRIPTORS: DESCRIPTORS: ACHING;DISCOMFORT

## 2025-02-25 NOTE — H&P (VIEW-ONLY)
"CPM/PAT Evaluation       Name: Bree Lamar (Bree Lamar \"Mal\")  /Age: 1990/34 y.o.     In-Person       Chief Complaint: Calculus of ureter    HPI: Bree Lamar is a 34 year old female with a history of kidney stones. She is S/p lithotripsy 2024. She has complaints of left lower abdominal pain. XR of her abdomin shows:    IMPRESSION:  Lower pole left renal stone. 2 adjacent stones in the distal left  ureter.    There are 2 faint calcifications overlying the left pelvis lateral  and inferior to the presumed distal left ureter. These could be  within the urinary bladder.    Moderate retained colonic stool.  She denies blood in the urine or painful urination. She is scheduled for a cystoscopy with a laser lithotripsy. She denies fever, chills, vomiting, chest pain, sob, dizziness,and palpitations.  Past Medical History:   Diagnosis Date    Abscess of nose 2024    Abscess, furuncle and carbuncle of nose 2018    Cellulitis of nose, external    Acute left ankle pain 2024    Allergic conjunctivitis of both eyes 2024    Anxiety disorder, unspecified 2021    Anxiety    Conjunctivitis 2024    DDD (degenerative disc disease), lumbar     Dental infection 2024    Diarrhea 2024    Fever, intermittent 2024    Hyperlipidemia     Hypertension     Otalgia 2024    Other general symptoms and signs 01/10/2017    Flu-like symptoms    Personal history of other diseases of the digestive system 2021    History of irritable bowel syndrome    Personal history of other diseases of the respiratory system 02/15/2016    History of acute bronchitis    Personal history of other mental and behavioral disorders 2022    History of depression    Personal history of other mental and behavioral disorders     History of autism    Pharyngitis with viral syndrome 2024    Sore throat 2024    Strain of left ankle 2024       Past Surgical " History:   Procedure Laterality Date    LITHOTRIPSY      MOUTH SURGERY  2016    Oral Surgery Tooth Extraction       Social History     Tobacco Use    Smoking status: Former     Current packs/day: 0.00     Average packs/day: 1 pack/day for 18.0 years (18.0 ttl pk-yrs)     Types: Cigarettes     Start date: 2005     Quit date: 2023     Years since quittin.0    Smokeless tobacco: Never   Substance Use Topics    Alcohol use: Yes     Comment: SOCIAL     Social History     Substance and Sexual Activity   Drug Use Yes    Types: Marijuana    Comment: THC GUMMIES RECREATIONAL - LAST USED 2024         Family History   Problem Relation Name Age of Onset    Asthma Sister      Crohn's disease Brother         Allergies   Allergen Reactions    Shellfish Derived Hives    Adhesive Tape-Silicones Itching and Rash     Current Outpatient Medications   Medication Sig Dispense Refill    albuterol (ProAir HFA) 90 mcg/actuation inhaler Inhale 2 puffs every 6 hours if needed for shortness of breath. 18 g 2    carvedilol (Coreg) 12.5 mg tablet Take 1 tablet by mouth twice daily 60 tablet 11    cetirizine (ZyrTEC) 10 mg tablet Take 1 tablet (10 mg) by mouth 2 times a day.      cholecalciferol (Vitamin D-3) 5,000 Units tablet Take 1 tablet (5,000 Units) by mouth once daily.      cyclobenzaprine (Flexeril) 10 mg tablet Take 1 tablet by mouth three times daily as needed for muscle spasm 90 tablet 0    DULoxetine (Cymbalta) 30 mg DR capsule Take 1 capsule (30 mg) by mouth once daily.      DULoxetine (Cymbalta) 60 mg DR capsule Take 1 capsule (60 mg) by mouth once daily.      hydrOXYzine pamoate (Vistaril) 50 mg capsule Take 1 capsule (50 mg) by mouth 3 times a day as needed for anxiety.      LORazepam (Ativan) 0.5 mg tablet Take 1 tablet (0.5 mg) by mouth once daily as needed for anxiety.      Loryna, 28, 3-0.02 mg tablet Take by mouth once daily.      ondansetron (Zofran) 4 mg tablet TAKE 1 TABLET BY MOUTH EVERY 8 HOURS  AS NEEDED FOR NAUSEA OR VOMITING 20 tablet 0    pantoprazole (ProtoNix) 40 mg EC tablet TAKE 1 TABLET BY MOUTH TWICE DAILY ::DO NOT CRUSH, CHEW OR SPLIT:: 60 tablet 2    rosuvastatin (Crestor) 10 mg tablet Take 1 tablet (10 mg) by mouth once daily at bedtime. 100 tablet 3    tamsulosin (Flomax) 0.4 mg 24 hr capsule Take 1 capsule by mouth once daily 30 capsule 0    topiramate (Topamax) 50 mg tablet Take 1 tablet (50 mg) by mouth 2 times a day. 200 tablet 1    traZODone (Desyrel) 100 mg tablet Take 1 tablet (100 mg) by mouth once daily at bedtime.      triamcinolone (Kenalog) 0.1 % ointment APPLY OINTMENT TOPICALLY TO AFFECTED AREA ON FEET TWICE DAILY FOR 14 DAYS. STOP FOR 7 DAYS, THEN CONTINUE AS NEEDED. AVOID FACE, ARMPITS AND GROIN.         0     No current facility-administered medications for this visit.       Review of Systems   Constitutional: Negative.    HENT: Negative.     Eyes: Negative.    Respiratory: Negative.     Cardiovascular: Negative.    Gastrointestinal:  Positive for abdominal pain, constipation and diarrhea.   Endocrine: Negative.    Musculoskeletal:  Positive for back pain.   Skin: Negative.    Allergic/Immunologic: Negative.    Neurological: Negative.    Hematological: Negative.    Psychiatric/Behavioral: Negative.                Physical Exam  Vitals reviewed. Nursing note reviewed: patient did not take her BP medication.  Constitutional:       Appearance: Normal appearance.   HENT:      Head: Normocephalic and atraumatic.      Nose: Nose normal.      Mouth/Throat:      Mouth: Mucous membranes are moist.      Pharynx: Oropharynx is clear.   Eyes:      Extraocular Movements: Extraocular movements intact.      Conjunctiva/sclera: Conjunctivae normal.   Cardiovascular:      Rate and Rhythm: Normal rate and regular rhythm.      Pulses: Normal pulses.      Heart sounds: Normal heart sounds.   Pulmonary:      Effort: Pulmonary effort is normal.      Breath sounds: Normal breath sounds.   Abdominal:  "     General: Bowel sounds are normal.      Palpations: Abdomen is soft.   Genitourinary:     Comments: Assessment deferred to physician    Musculoskeletal:         General: Normal range of motion.      Cervical back: Normal range of motion and neck supple.   Skin:     General: Skin is warm and dry.   Neurological:      General: No focal deficit present.      Mental Status: Mal is alert and oriented to person, place, and time.   Psychiatric:         Mood and Affect: Mood normal.         Behavior: Behavior normal.         Thought Content: Thought content normal.         Judgment: Judgment normal.          PAT AIRWAY:   Airway:     Mallampati::  II    TM distance::  <3 FB    Neck ROM::  Full  normal            Visit Vitals  BP (!) 154/101   Pulse 103 Comment: EKG   Temp 37 °C (98.6 °F) (Temporal)   Ht 1.651 m (5' 5\")   Wt 145 kg (319 lb 1.6 oz)   LMP  (LMP Unknown) Comment: ONLY SPOTTING DUE TO BIRTH CONTROL   SpO2 100%   BMI 53.10 kg/m²   OB Status OCP   Smoking Status Former   BSA 2.58 m²     ASA: III  CHADS: 2.8%  RCRI: 0.4%  DASI:24.2  METS:5.7  STOP BAN          Assessment and Plan:     Calculus of ureter : CYSTOSCOPY, WITH LASER LITHOTRIPSY   Hypertension: Carvedilol  Hyperlipidemia: Rosuvastatin  DDD ( Lumbar)  Fibromyalgia  Autism  IBS-C/D  Anxiety/ depression: Cymbalta, Ativan  BMI: 53.10    E[facility]: done in [facility]  LABS: CMP 24, CBC 24 reviewed    MAUREEN Weir-DAVINA            "

## 2025-02-25 NOTE — PREPROCEDURE INSTRUCTIONS
Medication List            Accurate as of February 25, 2025  7:58 AM. Always use your most recent med list.                albuterol 90 mcg/actuation inhaler  Commonly known as: ProAir HFA  Inhale 2 puffs every 6 hours if needed for shortness of breath.  Medication Adjustments for Surgery: Take/Use as prescribed     carvedilol 12.5 mg tablet  Commonly known as: Coreg  Take 1 tablet by mouth twice daily  Medication Adjustments for Surgery: Take on the morning of surgery     cholecalciferol 5,000 Units tablet  Commonly known as: Vitamin D-3  Additional Medication Adjustments for Surgery: Other (Comment)  Notes to patient: HOLD UNTIL AFTER SURGERY     cyclobenzaprine 10 mg tablet  Commonly known as: Flexeril  Take 1 tablet by mouth three times daily as needed for muscle spasm  Medication Adjustments for Surgery: Take/Use as prescribed     * DULoxetine 60 mg DR capsule  Commonly known as: Cymbalta  Medication Adjustments for Surgery: Take on the morning of surgery     * DULoxetine 30 mg DR capsule  Commonly known as: Cymbalta  Medication Adjustments for Surgery: Take on the morning of surgery     hydrOXYzine pamoate 50 mg capsule  Commonly known as: Vistaril  Medication Adjustments for Surgery: Do Not take on the morning of surgery     LORazepam 0.5 mg tablet  Commonly known as: Ativan  Medication Adjustments for Surgery: Take/Use as prescribed     Loryna (28) 3-0.02 mg tablet  Generic drug: drospirenone-ethinyl estradiol  Medication Adjustments for Surgery: Take/Use as prescribed     ondansetron 4 mg tablet  Commonly known as: Zofran  TAKE 1 TABLET BY MOUTH EVERY 8 HOURS AS NEEDED FOR NAUSEA OR VOMITING  Medication Adjustments for Surgery: Take/Use as prescribed     pantoprazole 40 mg EC tablet  Commonly known as: ProtoNix  TAKE 1 TABLET BY MOUTH TWICE DAILY ::DO NOT CRUSH, CHEW OR SPLIT::  Medication Adjustments for Surgery: Take on the morning of surgery     rosuvastatin 10 mg tablet  Commonly known as:  Crestor  Take 1 tablet (10 mg) by mouth once daily at bedtime.  Medication Adjustments for Surgery: Take/Use as prescribed     SUMAtriptan 100 mg tablet  Commonly known as: Imitrex  Take 1 tablet (100 mg) by mouth 1 time if needed for migraine. PRN  Medication Adjustments for Surgery: Do Not take on the morning of surgery     tamsulosin 0.4 mg 24 hr capsule  Commonly known as: Flomax  Take 1 capsule by mouth once daily  Medication Adjustments for Surgery: Take/Use as prescribed     topiramate 50 mg tablet  Commonly known as: Topamax  Take 1 tablet (50 mg) by mouth 2 times a day.  Medication Adjustments for Surgery: Take on the morning of surgery     traZODone 100 mg tablet  Commonly known as: Desyrel  Medication Adjustments for Surgery: Take/Use as prescribed     triamcinolone 0.1 % ointment  Commonly known as: Kenalog  Medication Adjustments for Surgery: Do Not take on the morning of surgery     ZyrTEC 10 mg tablet  Generic drug: cetirizine  Medication Adjustments for Surgery: Do Not take on the morning of surgery           * This list has 2 medication(s) that are the same as other medications prescribed for you. Read the directions carefully, and ask your doctor or other care provider to review them with you.                        Why must I stop eating and drinking near surgery time?  With sedation, food or liquid in your stomach can enter your lungs causing serious complications  Increases nausea and vomiting    When do I need to stop eating and drinking before my surgery?   Do not eat or drink after midnight the night before your surgery/procedure.  You may have small sips of water to take your medication.    PAT DISCHARGE INSTRUCTIONS    Please call the Same Day Surgery (SDS) Department of the hospital where your procedure will be performed after 2:00 PM the day before your surgery. If you are scheduled on a Monday, or a Tuesday following a Monday holiday, you will need to call on the last business day prior  to your surgery.    [facility]  [address] [place] [address] [phone]  [facility]  [address] [facility], [id number] [phone]  [facility]  [address] [place] [address] [phone]    Please let your surgeon know if:      You develop any open sores, shingles, burning or painful urination as these may increase your risk of an infection.   You no longer wish to have the surgery.   Any other personal circumstances change that may lead to the need to cancel or defer this surgery-such as being sick or getting admitted to any hospital within one week of your planned procedure.    Your contact details change, such as a change of address or phone number.    Starting now:     Please DO NOT drink alcohol or smoke for 24 hours before surgery. It is well known that quitting smoking can make a huge difference to your health and recovery from surgery. The longer you abstain from smoking, the better your chances of a healthy recovery. If you need help with quitting, call 1-800-QUIT-NOW to be connected to a trained counselor who will discuss the best methods to help you quit.     Before your surgery:    Please stop all supplements 7 days prior to surgery. Or as directed by your surgeon.   Please stop taking NSAID pain medicine such as Advil and Motrin 7 days before surgery.    If you develop any fever, cough, cold, rashes, cuts, scratches, scrapes, urinary symptoms or infection anywhere on your body (including teeth and gums) prior to surgery, please call your surgeon’s office as soon as possible. This may require treatment to reduce the chance of cancellation on the day of surgery.    The day before your surgery:   DIET- Please follow the diet instructions at the top of your packet.   Get a good night’s rest.  Use the special soap for bathing if you have  been instructed to use one.    Scheduled surgery times may change and you will be notified if this occurs - please check your personal voicemail for any updates.     On the morning of surgery:   Wear comfortable, loose fitting clothes which open in the front. Please do not wear moisturizers, creams, lotions, makeup or perfume.    Please bring with you to surgery:   Photo ID and insurance card   Current list of medicines and allergies   Pacemaker/ Defibrillator/Heart stent cards   CPAP machine and mask    Slings/ splints/ crutches   A copy of your complete advanced directive/DHPOA.    Please do NOT bring with you to surgery:   All jewelry and valuables should be left at home.   Prosthetic devices such as contact lenses, hearing aids, dentures, eyelash extensions, hairpins and body piercings must be removed prior to going in to the surgical suite.    After outpatient surgery:   A responsible adult MUST accompany you at the time of discharge and stay with you for 24 hours after your surgery. You may NOT drive yourself home after surgery.    Do not drive, operate machinery, make critical decisions or do activities that require co-ordination or balance until after a night’s sleep.   Do not drink alcoholic beverages for 24 hours.   Instructions for resuming your medications will be provided by your surgeon.    CALL YOUR DOCTOR AFTER SURGERY IF YOU HAVE:     Chills and/or a fever of 101° F or higher.    Redness, swelling, pus or drainage from your surgical wound or a bad smell from the wound.    Lightheadedness, fainting or confusion.    Persistent vomiting (throwing up) and are not able to eat or drink for 12 hours.    Three or more loose, watery bowel movements in 24 hours (diarrhea).   Difficulty or pain while urinating( after non-urological surgery)    Pain and swelling in your legs, especially if it is only on one side.    Difficulty breathing or are breathing faster than normal.    Any new concerning  symptoms.

## 2025-02-25 NOTE — CPM/PAT H&P
"CPM/PAT Evaluation       Name: Bree Lamar (Bree Lamar \"Mal\")  /Age: 1990/34 y.o.     In-Person       Chief Complaint: Calculus of ureter    HPI: Bree Lamar is a 34 year old female with a history of kidney stones. She is S/p lithotripsy 2024. She has complaints of left lower abdominal pain. XR of her abdomin shows:    IMPRESSION:  Lower pole left renal stone. 2 adjacent stones in the distal left  ureter.    There are 2 faint calcifications overlying the left pelvis lateral  and inferior to the presumed distal left ureter. These could be  within the urinary bladder.    Moderate retained colonic stool.  She denies blood in the urine or painful urination. She is scheduled for a cystoscopy with a laser lithotripsy. She denies fever, chills, vomiting, chest pain, sob, dizziness,and palpitations.  Past Medical History:   Diagnosis Date    Abscess of nose 2024    Abscess, furuncle and carbuncle of nose 2018    Cellulitis of nose, external    Acute left ankle pain 2024    Allergic conjunctivitis of both eyes 2024    Anxiety disorder, unspecified 2021    Anxiety    Conjunctivitis 2024    DDD (degenerative disc disease), lumbar     Dental infection 2024    Diarrhea 2024    Fever, intermittent 2024    Hyperlipidemia     Hypertension     Otalgia 2024    Other general symptoms and signs 01/10/2017    Flu-like symptoms    Personal history of other diseases of the digestive system 2021    History of irritable bowel syndrome    Personal history of other diseases of the respiratory system 02/15/2016    History of acute bronchitis    Personal history of other mental and behavioral disorders 2022    History of depression    Personal history of other mental and behavioral disorders     History of autism    Pharyngitis with viral syndrome 2024    Sore throat 2024    Strain of left ankle 2024       Past Surgical " History:   Procedure Laterality Date    LITHOTRIPSY      MOUTH SURGERY  2016    Oral Surgery Tooth Extraction       Social History     Tobacco Use    Smoking status: Former     Current packs/day: 0.00     Average packs/day: 1 pack/day for 18.0 years (18.0 ttl pk-yrs)     Types: Cigarettes     Start date: 2005     Quit date: 2023     Years since quittin.0    Smokeless tobacco: Never   Substance Use Topics    Alcohol use: Yes     Comment: SOCIAL     Social History     Substance and Sexual Activity   Drug Use Yes    Types: Marijuana    Comment: THC GUMMIES RECREATIONAL - LAST USED 2024         Family History   Problem Relation Name Age of Onset    Asthma Sister      Crohn's disease Brother         Allergies   Allergen Reactions    Shellfish Derived Hives    Adhesive Tape-Silicones Itching and Rash     Current Outpatient Medications   Medication Sig Dispense Refill    albuterol (ProAir HFA) 90 mcg/actuation inhaler Inhale 2 puffs every 6 hours if needed for shortness of breath. 18 g 2    carvedilol (Coreg) 12.5 mg tablet Take 1 tablet by mouth twice daily 60 tablet 11    cetirizine (ZyrTEC) 10 mg tablet Take 1 tablet (10 mg) by mouth 2 times a day.      cholecalciferol (Vitamin D-3) 5,000 Units tablet Take 1 tablet (5,000 Units) by mouth once daily.      cyclobenzaprine (Flexeril) 10 mg tablet Take 1 tablet by mouth three times daily as needed for muscle spasm 90 tablet 0    DULoxetine (Cymbalta) 30 mg DR capsule Take 1 capsule (30 mg) by mouth once daily.      DULoxetine (Cymbalta) 60 mg DR capsule Take 1 capsule (60 mg) by mouth once daily.      hydrOXYzine pamoate (Vistaril) 50 mg capsule Take 1 capsule (50 mg) by mouth 3 times a day as needed for anxiety.      LORazepam (Ativan) 0.5 mg tablet Take 1 tablet (0.5 mg) by mouth once daily as needed for anxiety.      Loryna, 28, 3-0.02 mg tablet Take by mouth once daily.      ondansetron (Zofran) 4 mg tablet TAKE 1 TABLET BY MOUTH EVERY 8 HOURS  AS NEEDED FOR NAUSEA OR VOMITING 20 tablet 0    pantoprazole (ProtoNix) 40 mg EC tablet TAKE 1 TABLET BY MOUTH TWICE DAILY ::DO NOT CRUSH, CHEW OR SPLIT:: 60 tablet 2    rosuvastatin (Crestor) 10 mg tablet Take 1 tablet (10 mg) by mouth once daily at bedtime. 100 tablet 3    tamsulosin (Flomax) 0.4 mg 24 hr capsule Take 1 capsule by mouth once daily 30 capsule 0    topiramate (Topamax) 50 mg tablet Take 1 tablet (50 mg) by mouth 2 times a day. 200 tablet 1    traZODone (Desyrel) 100 mg tablet Take 1 tablet (100 mg) by mouth once daily at bedtime.      triamcinolone (Kenalog) 0.1 % ointment APPLY OINTMENT TOPICALLY TO AFFECTED AREA ON FEET TWICE DAILY FOR 14 DAYS. STOP FOR 7 DAYS, THEN CONTINUE AS NEEDED. AVOID FACE, ARMPITS AND GROIN.         0     No current facility-administered medications for this visit.       Review of Systems   Constitutional: Negative.    HENT: Negative.     Eyes: Negative.    Respiratory: Negative.     Cardiovascular: Negative.    Gastrointestinal:  Positive for abdominal pain, constipation and diarrhea.   Endocrine: Negative.    Musculoskeletal:  Positive for back pain.   Skin: Negative.    Allergic/Immunologic: Negative.    Neurological: Negative.    Hematological: Negative.    Psychiatric/Behavioral: Negative.                Physical Exam  Vitals reviewed. Nursing note reviewed: patient did not take her BP medication.  Constitutional:       Appearance: Normal appearance.   HENT:      Head: Normocephalic and atraumatic.      Nose: Nose normal.      Mouth/Throat:      Mouth: Mucous membranes are moist.      Pharynx: Oropharynx is clear.   Eyes:      Extraocular Movements: Extraocular movements intact.      Conjunctiva/sclera: Conjunctivae normal.   Cardiovascular:      Rate and Rhythm: Normal rate and regular rhythm.      Pulses: Normal pulses.      Heart sounds: Normal heart sounds.   Pulmonary:      Effort: Pulmonary effort is normal.      Breath sounds: Normal breath sounds.   Abdominal:  "     General: Bowel sounds are normal.      Palpations: Abdomen is soft.   Genitourinary:     Comments: Assessment deferred to physician    Musculoskeletal:         General: Normal range of motion.      Cervical back: Normal range of motion and neck supple.   Skin:     General: Skin is warm and dry.   Neurological:      General: No focal deficit present.      Mental Status: Mal is alert and oriented to person, place, and time.   Psychiatric:         Mood and Affect: Mood normal.         Behavior: Behavior normal.         Thought Content: Thought content normal.         Judgment: Judgment normal.          PAT AIRWAY:   Airway:     Mallampati::  II    TM distance::  <3 FB    Neck ROM::  Full  normal            Visit Vitals  BP (!) 154/101   Pulse 103 Comment: EKG   Temp 37 °C (98.6 °F) (Temporal)   Ht 1.651 m (5' 5\")   Wt 145 kg (319 lb 1.6 oz)   LMP  (LMP Unknown) Comment: ONLY SPOTTING DUE TO BIRTH CONTROL   SpO2 100%   BMI 53.10 kg/m²   OB Status OCP   Smoking Status Former   BSA 2.58 m²     ASA: III  CHADS: 2.8%  RCRI: 0.4%  DASI:24.2  METS:5.7  STOP BAN          Assessment and Plan:     Calculus of ureter : CYSTOSCOPY, WITH LASER LITHOTRIPSY   Hypertension: Carvedilol  Hyperlipidemia: Rosuvastatin  DDD ( Lumbar)  Fibromyalgia  Autism  IBS-C/D  Anxiety/ depression: Cymbalta, Ativan  BMI: 53.10    EKG: done in [facility]  LABS: CMP 24, CBC 24 reviewed    MAUREEN Weir-DAVINA            "

## 2025-02-26 ENCOUNTER — ANESTHESIA (OUTPATIENT)
Dept: OPERATING ROOM | Facility: HOSPITAL | Age: 35
End: 2025-02-26
Payer: MEDICARE

## 2025-02-26 ENCOUNTER — ANESTHESIA EVENT (OUTPATIENT)
Dept: OPERATING ROOM | Facility: HOSPITAL | Age: 35
End: 2025-02-26
Payer: MEDICARE

## 2025-02-26 ENCOUNTER — APPOINTMENT (OUTPATIENT)
Dept: RADIOLOGY | Facility: HOSPITAL | Age: 35
End: 2025-02-26
Payer: MEDICARE

## 2025-02-26 ENCOUNTER — HOSPITAL ENCOUNTER (OUTPATIENT)
Facility: HOSPITAL | Age: 35
Setting detail: OUTPATIENT SURGERY
Discharge: HOME | End: 2025-02-26
Attending: UROLOGY | Admitting: UROLOGY
Payer: MEDICARE

## 2025-02-26 VITALS
HEART RATE: 115 BPM | OXYGEN SATURATION: 97 % | RESPIRATION RATE: 16 BRPM | SYSTOLIC BLOOD PRESSURE: 136 MMHG | TEMPERATURE: 97 F | DIASTOLIC BLOOD PRESSURE: 99 MMHG

## 2025-02-26 DIAGNOSIS — N20.1 CALCULUS OF URETER: ICD-10-CM

## 2025-02-26 PROBLEM — E66.9 OBESITY: Status: ACTIVE | Noted: 2025-02-26

## 2025-02-26 LAB — PREGNANCY TEST URINE, POC: NEGATIVE

## 2025-02-26 PROCEDURE — 3600000003 HC OR TIME - INITIAL BASE CHARGE - PROCEDURE LEVEL THREE: Performed by: UROLOGY

## 2025-02-26 PROCEDURE — 7100000001 HC RECOVERY ROOM TIME - INITIAL BASE CHARGE: Performed by: UROLOGY

## 2025-02-26 PROCEDURE — 76000 FLUOROSCOPY <1 HR PHYS/QHP: CPT

## 2025-02-26 PROCEDURE — 81025 URINE PREGNANCY TEST: CPT | Performed by: ANESTHESIOLOGY

## 2025-02-26 PROCEDURE — 2500000001 HC RX 250 WO HCPCS SELF ADMINISTERED DRUGS (ALT 637 FOR MEDICARE OP)

## 2025-02-26 PROCEDURE — 3600000008 HC OR TIME - EACH INCREMENTAL 1 MINUTE - PROCEDURE LEVEL THREE: Performed by: UROLOGY

## 2025-02-26 PROCEDURE — 2500000004 HC RX 250 GENERAL PHARMACY W/ HCPCS (ALT 636 FOR OP/ED): Performed by: ANESTHESIOLOGIST ASSISTANT

## 2025-02-26 PROCEDURE — 2500000004 HC RX 250 GENERAL PHARMACY W/ HCPCS (ALT 636 FOR OP/ED): Performed by: UROLOGY

## 2025-02-26 PROCEDURE — 7100000002 HC RECOVERY ROOM TIME - EACH INCREMENTAL 1 MINUTE: Performed by: UROLOGY

## 2025-02-26 PROCEDURE — A52353 PR CYSTO/URETERO/PYELOSCOPY W/LITHOTRIPSY: Performed by: ANESTHESIOLOGIST ASSISTANT

## 2025-02-26 PROCEDURE — 2720000007 HC OR 272 NO HCPCS: Performed by: UROLOGY

## 2025-02-26 PROCEDURE — A52353 PR CYSTO/URETERO/PYELOSCOPY W/LITHOTRIPSY: Performed by: ANESTHESIOLOGY

## 2025-02-26 PROCEDURE — 2500000004 HC RX 250 GENERAL PHARMACY W/ HCPCS (ALT 636 FOR OP/ED): Performed by: ANESTHESIOLOGY

## 2025-02-26 PROCEDURE — 7100000009 HC PHASE TWO TIME - INITIAL BASE CHARGE: Performed by: UROLOGY

## 2025-02-26 PROCEDURE — 3700000001 HC GENERAL ANESTHESIA TIME - INITIAL BASE CHARGE: Performed by: UROLOGY

## 2025-02-26 PROCEDURE — 7100000010 HC PHASE TWO TIME - EACH INCREMENTAL 1 MINUTE: Performed by: UROLOGY

## 2025-02-26 PROCEDURE — 3700000002 HC GENERAL ANESTHESIA TIME - EACH INCREMENTAL 1 MINUTE: Performed by: UROLOGY

## 2025-02-26 RX ORDER — KETOROLAC TROMETHAMINE 30 MG/ML
INJECTION, SOLUTION INTRAMUSCULAR; INTRAVENOUS AS NEEDED
Status: DISCONTINUED | OUTPATIENT
Start: 2025-02-26 | End: 2025-02-27

## 2025-02-26 RX ORDER — MIDAZOLAM HYDROCHLORIDE 1 MG/ML
INJECTION, SOLUTION INTRAMUSCULAR; INTRAVENOUS AS NEEDED
Status: DISCONTINUED | OUTPATIENT
Start: 2025-02-26 | End: 2025-02-27

## 2025-02-26 RX ORDER — ONDANSETRON HYDROCHLORIDE 2 MG/ML
4 INJECTION, SOLUTION INTRAVENOUS ONCE AS NEEDED
Status: DISCONTINUED | OUTPATIENT
Start: 2025-02-26 | End: 2025-02-26 | Stop reason: HOSPADM

## 2025-02-26 RX ORDER — MEPERIDINE HYDROCHLORIDE 25 MG/ML
12.5 INJECTION INTRAMUSCULAR; INTRAVENOUS; SUBCUTANEOUS EVERY 10 MIN PRN
Status: DISCONTINUED | OUTPATIENT
Start: 2025-02-26 | End: 2025-02-26 | Stop reason: HOSPADM

## 2025-02-26 RX ORDER — HYDROMORPHONE HYDROCHLORIDE 0.2 MG/ML
0.2 INJECTION INTRAMUSCULAR; INTRAVENOUS; SUBCUTANEOUS EVERY 5 MIN PRN
Status: DISCONTINUED | OUTPATIENT
Start: 2025-02-26 | End: 2025-02-26 | Stop reason: HOSPADM

## 2025-02-26 RX ORDER — ONDANSETRON HYDROCHLORIDE 2 MG/ML
INJECTION, SOLUTION INTRAVENOUS AS NEEDED
Status: DISCONTINUED | OUTPATIENT
Start: 2025-02-26 | End: 2025-02-27

## 2025-02-26 RX ORDER — METOCLOPRAMIDE 10 MG/1
10 TABLET ORAL ONCE
Status: COMPLETED | OUTPATIENT
Start: 2025-02-26 | End: 2025-02-26

## 2025-02-26 RX ORDER — METOCLOPRAMIDE 10 MG/1
TABLET ORAL
Status: COMPLETED
Start: 2025-02-26 | End: 2025-02-26

## 2025-02-26 RX ORDER — OXYCODONE HYDROCHLORIDE 5 MG/1
5 TABLET ORAL EVERY 4 HOURS PRN
Status: DISCONTINUED | OUTPATIENT
Start: 2025-02-26 | End: 2025-02-26 | Stop reason: HOSPADM

## 2025-02-26 RX ORDER — PROPOFOL 10 MG/ML
INJECTION, EMULSION INTRAVENOUS AS NEEDED
Status: DISCONTINUED | OUTPATIENT
Start: 2025-02-26 | End: 2025-02-27

## 2025-02-26 RX ORDER — DIPHENHYDRAMINE HYDROCHLORIDE 50 MG/ML
12.5 INJECTION INTRAMUSCULAR; INTRAVENOUS ONCE AS NEEDED
Status: DISCONTINUED | OUTPATIENT
Start: 2025-02-26 | End: 2025-02-26 | Stop reason: HOSPADM

## 2025-02-26 RX ORDER — FENTANYL CITRATE 50 UG/ML
INJECTION, SOLUTION INTRAMUSCULAR; INTRAVENOUS AS NEEDED
Status: DISCONTINUED | OUTPATIENT
Start: 2025-02-26 | End: 2025-02-27

## 2025-02-26 RX ORDER — FAMOTIDINE 20 MG/1
20 TABLET, FILM COATED ORAL ONCE
Status: COMPLETED | OUTPATIENT
Start: 2025-02-26 | End: 2025-02-26

## 2025-02-26 RX ORDER — IPRATROPIUM BROMIDE 0.5 MG/2.5ML
500 SOLUTION RESPIRATORY (INHALATION) ONCE
Status: DISCONTINUED | OUTPATIENT
Start: 2025-02-26 | End: 2025-02-26 | Stop reason: HOSPADM

## 2025-02-26 RX ORDER — ALBUTEROL SULFATE 0.83 MG/ML
2.5 SOLUTION RESPIRATORY (INHALATION) ONCE AS NEEDED
Status: DISCONTINUED | OUTPATIENT
Start: 2025-02-26 | End: 2025-02-26 | Stop reason: HOSPADM

## 2025-02-26 RX ORDER — HYDRALAZINE HYDROCHLORIDE 20 MG/ML
5 INJECTION INTRAMUSCULAR; INTRAVENOUS EVERY 30 MIN PRN
Status: DISCONTINUED | OUTPATIENT
Start: 2025-02-26 | End: 2025-02-26 | Stop reason: HOSPADM

## 2025-02-26 RX ORDER — FAMOTIDINE 20 MG/1
TABLET, FILM COATED ORAL
Status: COMPLETED
Start: 2025-02-26 | End: 2025-02-26

## 2025-02-26 RX ADMIN — ONDANSETRON HYDROCHLORIDE 4 MG: 2 INJECTION INTRAMUSCULAR; INTRAVENOUS at 13:22

## 2025-02-26 RX ADMIN — METOCLOPRAMIDE 10 MG: 10 TABLET ORAL at 13:03

## 2025-02-26 RX ADMIN — KETOROLAC TROMETHAMINE 30 MG: 30 INJECTION, SOLUTION INTRAMUSCULAR at 13:39

## 2025-02-26 RX ADMIN — SODIUM CHLORIDE, POTASSIUM CHLORIDE, SODIUM LACTATE AND CALCIUM CHLORIDE: 600; 310; 30; 20 INJECTION, SOLUTION INTRAVENOUS at 13:10

## 2025-02-26 RX ADMIN — FENTANYL CITRATE 25 MCG: 50 INJECTION, SOLUTION INTRAMUSCULAR; INTRAVENOUS at 13:27

## 2025-02-26 RX ADMIN — HYDRALAZINE HYDROCHLORIDE 5 MG: 20 INJECTION INTRAMUSCULAR; INTRAVENOUS at 14:21

## 2025-02-26 RX ADMIN — FAMOTIDINE 20 MG: 20 TABLET, FILM COATED ORAL at 13:02

## 2025-02-26 RX ADMIN — CEFAZOLIN 3 G: 3 INJECTION, POWDER, FOR SOLUTION INTRAMUSCULAR; INTRAVENOUS at 13:17

## 2025-02-26 RX ADMIN — FENTANYL CITRATE 50 MCG: 50 INJECTION, SOLUTION INTRAMUSCULAR; INTRAVENOUS at 13:22

## 2025-02-26 RX ADMIN — DEXAMETHASONE SODIUM PHOSPHATE 4 MG: 4 INJECTION, SOLUTION INTRAMUSCULAR; INTRAVENOUS at 13:22

## 2025-02-26 RX ADMIN — PROPOFOL 200 MG: 10 INJECTION, EMULSION INTRAVENOUS at 13:14

## 2025-02-26 RX ADMIN — FENTANYL CITRATE 25 MCG: 50 INJECTION, SOLUTION INTRAMUSCULAR; INTRAVENOUS at 13:14

## 2025-02-26 RX ADMIN — MIDAZOLAM 2 MG: 1 INJECTION INTRAMUSCULAR; INTRAVENOUS at 13:14

## 2025-02-26 SDOH — HEALTH STABILITY: MENTAL HEALTH: CURRENT SMOKER: 0

## 2025-02-26 ASSESSMENT — PAIN SCALES - GENERAL
PAINLEVEL_OUTOF10: 5 - MODERATE PAIN
PAINLEVEL_OUTOF10: 0 - NO PAIN
PAINLEVEL_OUTOF10: 3
PAINLEVEL_OUTOF10: 3
PAINLEVEL_OUTOF10: 0 - NO PAIN

## 2025-02-26 ASSESSMENT — COLUMBIA-SUICIDE SEVERITY RATING SCALE - C-SSRS
2. HAVE YOU ACTUALLY HAD ANY THOUGHTS OF KILLING YOURSELF?: NO
6. HAVE YOU EVER DONE ANYTHING, STARTED TO DO ANYTHING, OR PREPARED TO DO ANYTHING TO END YOUR LIFE?: NO
1. IN THE PAST MONTH, HAVE YOU WISHED YOU WERE DEAD OR WISHED YOU COULD GO TO SLEEP AND NOT WAKE UP?: NO

## 2025-02-26 ASSESSMENT — PAIN DESCRIPTION - DESCRIPTORS
DESCRIPTORS: ACHING
DESCRIPTORS: CRAMPING

## 2025-02-26 NOTE — ANESTHESIA PROCEDURE NOTES
Airway  Date/Time: 2/26/2025 1:15 PM  Urgency: elective    Airway not difficult    Staffing  Performed: CAA   Authorized by: Andrea Chavez MD    Performed by: Andrea Chavez MD  Patient location during procedure: OR    Indications and Patient Condition  Indications for airway management: anesthesia  Spontaneous ventilation: present  Preoxygenated: yes  Patient position: sniffing  MILS maintained throughout  Mask difficulty assessment: 0 - not attempted    Final Airway Details  Final airway type: supraglottic airway      Successful airway: classic  Size 4     Number of attempts at approach: 1  Number of other approaches attempted: 0    Additional Comments  Lips and teeth in pre-anesthetic condition.

## 2025-02-26 NOTE — OP NOTE
"CYSTOSCOPY, WITH LASER LITHOTRIPSY (L) Operative Note     Date: 2025  OR Location: [facility] OR    Name: Bree Lamar \"Mal\", : 1990, Age: 34 y.o., MRN: [id number], Sex: adult    Diagnosis  Pre-op Diagnosis      * Calculus of ureter [N20.1] Post-op Diagnosis     * Calculus of ureter [N20.1]     Procedures  CYSTOSCOPY, WITH LASER LITHOTRIPSY  67834 - DC CYSTO W/URETEROSCOPY W/LITHOTRIPSY      Surgeons      * Nahed Lara - Primary    Resident/Fellow/Other Assistant:  Surgeons and Role:  * No surgeons found with a matching role *    Staff:   Circulator: Elizabeth Brewster Person: Shellie    Anesthesia Staff: Anesthesiologist: Andrea Chavez MD  C-AA: JOEL Quick    Procedure Summary  Anesthesia: General  ASA: III  Estimated Blood Loss: 5mL  Intra-op Medications:   Administrations occurring from 1300 to 1345 on 25:   Medication Name Total Dose   dexAMETHasone (Decadron) 4 mg/mL 4 mg   fentaNYL PF 0.05 mg/mL 100 mcg   ketorolac (Toradol) 30 mg 30 mg   LR bolus Cannot be calculated   midazolam (Versed) 1 mg/1 mL 2 mg   ondansetron 2 mg/mL 4 mg   propofol (Diprivan) injection 10 mg/mL 200 mg   ceFAZolin (Ancef) 3 g in dextrose 5%  mL 3 g   famotidine (Pepcid) tablet 20 mg 20 mg   metoclopramide (Reglan) tablet 10 mg 10 mg              Anesthesia Record               Intraprocedure I/O Totals          Intake    LR bolus 450.00 mL    Total Intake 450 mL          Specimen:   ID Type Source Tests Collected by Time   A : left ureter stone Calculus Urine, Clean Catch CALCULI (STONE) ANALYSIS Nahed Lara MD 2025 1338                 Drains and/or Catheters: * None in log *    Tourniquet Times:         Implants:     Findings: several stones distal left ureter,easily broen and passed to bldder    Indications: Bree Lamar \"Mal\" is an 34 y.o. adult who is having surgery for Calculus of ureter [N20.1].      The patient was seen in the preoperative area. The risks, benefits, " complications, treatment options, non-operative alternatives, expected recovery and outcomes were discussed with the patient. The possibilities of reaction to medication, pulmonary aspiration, injury to surrounding structures, bleeding, recurrent infection, the need for additional procedures, failure to diagnose a condition, and creating a complication requiring transfusion or operation were discussed with the patient. The patient concurred with the proposed plan, giving informed consent.  The site of surgery was properly noted/marked if necessary per policy. The patient has been actively warmed in preoperative area. Preoperative antibiotics have been ordered and given within 1 hours of incision. Venous thrombosis prophylaxis have been ordered including bilateral sequential compression devices    Procedure Details: After induction, placed in low lithotomy and prepped and draped. A rigid ureteroscope was placed into the bladder, the left ureter was identified and easily entered and stone identified.  A 365 micron fiber was placed and the stone ws broken and dusted to completion w pieces seen to efflux out of ureter.  Any remaining fragments were swept clear into the bladder  Complications:  None; patient tolerated the procedure well.    Disposition: PACU - hemodynamically stable.  Condition: stable                 Additional Details:      Attending Attestation: I performed the procedure.    Nahed Lara  Phone Number: [phone]

## 2025-02-26 NOTE — ANESTHESIA PREPROCEDURE EVALUATION
"Patient: Bree Lamar \"Mal\"    Procedure Information       Date/Time: 02/26/25 1300    Procedure: CYSTOSCOPY, WITH LASER LITHOTRIPSY (Left) - Cysto ureteresocopy laser lithotripsy Left    Location: [facility] OR 03 / Virtual [facility] OR    Surgeons: Nahed Lara MD            Relevant Problems   Anesthesia (within normal limits)      Cardiac   (+) Hyperlipidemia   (+) Hypertension      Pulmonary   (+) CHAN (dyspnea on exertion)      Neuro   (+) Anxiety disorder   (+) Autism (HHS-HCC)   (+) Cervical radiculopathy   (+) Mild episode of recurrent major depressive disorder (CMS-HCC)      GI   (+) IBS (irritable bowel syndrome)      /Renal   (+) Calculus of kidney with calculus of ureter      Liver (within normal limits)      Endocrine   (+) Obesity      Hematology   (+) Anemia      Musculoskeletal   (+) Fibromyalgia   (+) Osteoarthritis      HEENT (within normal limits)      ID   (+) Candidal intertrigo      Skin (within normal limits)      GYN   (+) DUB (dysfunctional uterine bleeding)   (+) Menorrhagia with regular cycle       Clinical information reviewed:    Allergies  Meds     OB Status           NPO Detail:  NPO/Void Status  Date of Last Liquid: 02/26/25  Time of Last Liquid: 0930 (Sip of water with AM meds)  Date of Last Solid: 02/25/25  Time of Last Solid: 2350  Last Intake Type: Clear fluids  Time of Last Void: 1145         Physical Exam    Airway  Mallampati: III  TM distance: >3 FB  Neck ROM: full     Cardiovascular    Dental - normal exam     Pulmonary    Abdominal            Anesthesia Plan    History of general anesthesia?: yes  History of complications of general anesthesia?: no    ASA 3     general     The patient is not a current smoker.  Patient was not previously instructed to abstain from smoking on day of procedure.  Patient did not smoke on day of procedure.  Education provided regarding risk of obstructive sleep apnea.  intravenous induction   Anesthetic plan and risks discussed with " patient.    Plan discussed with CRNA and CAA.

## 2025-02-26 NOTE — INTERVAL H&P NOTE
H&P reviewed. The patient was examined and there are no changes to the H&P.  
The patient is a 63y Male complaining of fall.

## 2025-02-26 NOTE — PERIOPERATIVE NURSING NOTE
VSS. IV INFUSING WELL.PAIN TOLERABLE.GORAN. PO WELL.  NO N/V AT THIS TIME. MOTHER PRESENT. VOIDED IN PACU WE WERE TOLD BY PACU NURSE. STRAINER PROVIDED FOR HOME USE FROM [facility]. DISCHARGE INSTRUCTIONS REVIEWED AND UNDERSTOOD. D/C VIA WHEELCHAIR.

## 2025-02-26 NOTE — PERIOPERATIVE NURSING NOTE
Patient received to Pac[facility] Atkinson # 6 from OR. Anesthesia at bedside. Report received. Initial assessment complete. VSS on Cardiac Monitor. Patient denies pain at present. Resting comfortable at this time. No S/S of Resp Distress noted.

## 2025-02-26 NOTE — PERIOPERATIVE NURSING NOTE
"1140--Patient ambulated to \[facility]\"" and is accompanied by her mother Marybeth.  Patient is alert and oriented, rates low back pain at a \"5/10\" at this time and reports her skin is intact other then some excoriated areas on thumbs.  Patient took her Carvedilol this morning at 0930.    1212--IV NAMRATA Barajas called to the bedside.  "

## 2025-02-28 ENCOUNTER — APPOINTMENT (OUTPATIENT)
Dept: PRIMARY CARE | Facility: CLINIC | Age: 35
End: 2025-02-28
Payer: MEDICARE

## 2025-02-28 LAB
ATRIAL RATE: 103 BPM
P AXIS: 61 DEGREES
P OFFSET: 193 MS
P ONSET: 148 MS
PR INTERVAL: 138 MS
Q ONSET: 217 MS
QRS COUNT: 16 BEATS
QRS DURATION: 88 MS
QT INTERVAL: 356 MS
QTC CALCULATION(BAZETT): 466 MS
QTC FREDERICIA: 426 MS
R AXIS: 58 DEGREES
T AXIS: 39 DEGREES
T OFFSET: 395 MS
VENTRICULAR RATE: 103 BPM

## 2025-02-28 ASSESSMENT — PAIN SCALES - GENERAL: PAIN_LEVEL: 3

## 2025-02-28 NOTE — ANESTHESIA POSTPROCEDURE EVALUATION
"Patient: Bree Lamar \"Mal\"    Procedure Summary       Date: 02/26/25 Room / Location: [facility] OR 03 / [facility] OR    Anesthesia Start: 1310 Anesthesia Stop: 1355    Procedure: CYSTOSCOPY, WITH LASER LITHOTRIPSY (Left) Diagnosis:       Calculus of ureter      (Calculus of ureter [N20.1])    Surgeons: Nahed Lara MD Responsible Provider: Andrea Chavez MD    Anesthesia Type: general ASA Status: 3            Anesthesia Type: general    Vitals Value Taken Time   /88 02/26/25 1440   Temp 36.1 °C (97 °F) 02/26/25 1430   Pulse 103 02/26/25 1440   Resp 16 02/26/25 1440   SpO2 97 % 02/26/25 1440       Anesthesia Post Evaluation    Patient location during evaluation: bedside  Patient participation: complete - patient participated  Level of consciousness: awake  Pain score: 3  Pain management: adequate  Multimodal analgesia pain management approach  Airway patency: patent  Two or more strategies used to mitigate risk of obstructive sleep apnea  Cardiovascular status: acceptable  Respiratory status: acceptable  Hydration status: acceptable  Postoperative Nausea and Vomiting: none        No notable events documented.    "

## 2025-03-05 DIAGNOSIS — K30 FUNCTIONAL DYSPEPSIA: ICD-10-CM

## 2025-03-06 RX ORDER — PANTOPRAZOLE SODIUM 40 MG/1
TABLET, DELAYED RELEASE ORAL
Qty: 60 TABLET | Refills: 0 | Status: SHIPPED | OUTPATIENT
Start: 2025-03-06

## 2025-03-12 DIAGNOSIS — M79.7 FIBROMYALGIA: ICD-10-CM

## 2025-03-13 RX ORDER — CYCLOBENZAPRINE HCL 10 MG
10 TABLET ORAL 3 TIMES DAILY PRN
Qty: 90 TABLET | Refills: 0 | Status: SHIPPED | OUTPATIENT
Start: 2025-03-13

## 2025-04-06 DIAGNOSIS — K30 FUNCTIONAL DYSPEPSIA: ICD-10-CM

## 2025-04-07 RX ORDER — PANTOPRAZOLE SODIUM 40 MG/1
TABLET, DELAYED RELEASE ORAL
Qty: 60 TABLET | Refills: 0 | Status: SHIPPED | OUTPATIENT
Start: 2025-04-07

## 2025-04-16 ENCOUNTER — HOSPITAL ENCOUNTER (OUTPATIENT)
Dept: RADIOLOGY | Facility: CLINIC | Age: 35
Discharge: HOME | End: 2025-04-16
Payer: MEDICARE

## 2025-04-16 ENCOUNTER — OFFICE VISIT (OUTPATIENT)
Dept: ORTHOPEDIC SURGERY | Facility: CLINIC | Age: 35
End: 2025-04-16
Payer: MEDICARE

## 2025-04-16 DIAGNOSIS — M54.42 CHRONIC BILATERAL LOW BACK PAIN WITH BILATERAL SCIATICA: ICD-10-CM

## 2025-04-16 DIAGNOSIS — M54.2 NECK PAIN: ICD-10-CM

## 2025-04-16 DIAGNOSIS — M54.41 CHRONIC BILATERAL LOW BACK PAIN WITH BILATERAL SCIATICA: ICD-10-CM

## 2025-04-16 DIAGNOSIS — M54.2 NECK PAIN: Primary | ICD-10-CM

## 2025-04-16 DIAGNOSIS — M54.41 LOW BACK PAIN WITH BILATERAL SCIATICA, UNSPECIFIED BACK PAIN LATERALITY, UNSPECIFIED CHRONICITY: ICD-10-CM

## 2025-04-16 DIAGNOSIS — G89.29 CHRONIC BILATERAL LOW BACK PAIN WITH BILATERAL SCIATICA: ICD-10-CM

## 2025-04-16 DIAGNOSIS — M54.42 LOW BACK PAIN WITH BILATERAL SCIATICA, UNSPECIFIED BACK PAIN LATERALITY, UNSPECIFIED CHRONICITY: ICD-10-CM

## 2025-04-16 PROCEDURE — 72050 X-RAY EXAM NECK SPINE 4/5VWS: CPT | Performed by: RADIOLOGY

## 2025-04-16 PROCEDURE — 99203 OFFICE O/P NEW LOW 30 MIN: CPT | Performed by: STUDENT IN AN ORGANIZED HEALTH CARE EDUCATION/TRAINING PROGRAM

## 2025-04-16 PROCEDURE — 72120 X-RAY BEND ONLY L-S SPINE: CPT

## 2025-04-16 PROCEDURE — 72050 X-RAY EXAM NECK SPINE 4/5VWS: CPT

## 2025-04-16 PROCEDURE — 1036F TOBACCO NON-USER: CPT | Performed by: STUDENT IN AN ORGANIZED HEALTH CARE EDUCATION/TRAINING PROGRAM

## 2025-04-16 PROCEDURE — 99213 OFFICE O/P EST LOW 20 MIN: CPT | Performed by: STUDENT IN AN ORGANIZED HEALTH CARE EDUCATION/TRAINING PROGRAM

## 2025-04-16 ASSESSMENT — PAIN SCALES - GENERAL: PAINLEVEL_OUTOF10: 7

## 2025-04-16 ASSESSMENT — PAIN - FUNCTIONAL ASSESSMENT: PAIN_FUNCTIONAL_ASSESSMENT: 0-10

## 2025-04-16 ASSESSMENT — ENCOUNTER SYMPTOMS
OCCASIONAL FEELINGS OF UNSTEADINESS: 0
LOSS OF SENSATION IN FEET: 0
DEPRESSION: 0

## 2025-04-16 ASSESSMENT — PAIN DESCRIPTION - DESCRIPTORS: DESCRIPTORS: ACHING;STABBING

## 2025-04-16 NOTE — PROGRESS NOTES
Orthopaedic Spine Surgery Clinic Note    Patient ID: Bree Lamar is a 34 y.o. adult.    Chief Complaint  Chief Complaint   Patient presents with    Spine - Pain     NPV       History of Present Illness  Mal Lamar is a 34-year-old nonbinary patient who presents for initial evaluation of chronic low back and bilateral lower extremity pain in association with bilateral hand numbness and paresthesias.  Patient states that their symptoms have been longstanding for several months to years.  They relate chronic pain in their low back in a bandlike distribution that then intermittently radiates to bilateral groins as well as lateral hips and proceeds down to the knees.  There is a question of whether they were having knee arthritis related pain which was evaluated by rheumatologist and found to be noninflammatory.  They also state that laying on their side or standing and walking too long exacerbates the pain in the back that then proceeds down the legs.  There are intermittent numbness and paresthesias in the feet.    Separately, patient relates symptoms of bilateral hand numbness and paresthesias.  They do state that they have preserved dexterity overall but there are subtle things that are still difficult for example peeling potatoes.  They deny any bowel or bladder control issues.    Patient has worked with several care providers over the past several months to years with regards to these various pains.  They were diagnosed with fibromyalgia and have been attempting to connect with rheumatology with regards to evaluation and management for this.  They have also been working with a pain management provider at [facility].  They have tried physical therapy back in 2023 for an approximate 4-month period.  They have also tried several oral medications, currently on Flexeril, Cymbalta, and topiramate.      Relevant PMH/PSH for spine  Autism  BMI 53.10  Anxiety, depression    Medical History[1]    Surgical  History[2]    Social History[3]    Family History[4]    Allergies[5]    Current Outpatient Medications   Medication Instructions    albuterol (ProAir HFA) 90 mcg/actuation inhaler 2 puffs, inhalation, Every 6 hours PRN    carvedilol (COREG) 12.5 mg, oral, 2 times daily    cetirizine (ZyrTEC) 10 mg tablet 1 tablet, 2 times daily    cholecalciferol (Vitamin D-3) 5,000 Units tablet 1 tablet, Daily    cyclobenzaprine (FLEXERIL) 10 mg, oral, 3 times daily PRN    DULoxetine (CYMBALTA) 60 mg, Daily RT    DULoxetine (CYMBALTA) 30 mg, Daily RT    hydrOXYzine pamoate (VISTARIL) 50 mg, 3 times daily PRN    LORazepam (ATIVAN) 0.5 mg, Daily PRN    Loryna, 28, 3-0.02 mg tablet Daily    ondansetron (ZOFRAN) 4 mg, oral, Every 8 hours PRN    pantoprazole (ProtoNix) 40 mg EC tablet TAKE 1 TABLET BY MOUTH TWICE DAILY . SWALLOW WHOLE, DO NOT CRUSH, SPLIT, OR CHEW.    rosuvastatin (CRESTOR) 10 mg, oral, Nightly    SUMAtriptan (IMITREX) 100 mg, oral, Once as needed, PRN    tamsulosin (FLOMAX) 0.4 mg, oral, Daily    topiramate (TOPAMAX) 50 mg, oral, 2 times daily    traZODone (Desyrel) 100 mg tablet 1 tablet, Nightly    triamcinolone (Kenalog) 0.1 % ointment          Vitals & Measurements  There were no vitals filed for this visit.     Ortho Exam  General: AO x 3, NAD  Cardio: examined extremities perfused by peripheral palpation  Resp: breathing unlabored  Gait: within normal limits, non-antalgic  Positive tenderness to palpation in the lower lumbar paraspinal musculature as well as the trochanteric bursae bilaterally    Upper Extremity  Right  Left  Deltoid   5/5  5/5  Biceps   5/5  5/5  Triceps   5/5  5/5  Wrist extension  5/5  5/5     5/5  5/5  Intrinsics  5/5  5/5      Lower Extremity  Right  Left  IP   5/5  5/5  Quadriceps  5/5  5/5  Tibialis anterior  5/5  5/5  EHL   5/5  5/5  Gastrocnemius  5/5  5/5    Sensation: Normal to light touch throughout upper and lower extremities bilaterally.    Reflexes:  Right   Left  Bi  1+    1+  Tri  0   0  BR  2+  2+  Q  0  0  A   0   0    Encarnacion: negative  IBR: Positive bilaterally  Clonus: negative        Diagnostic Results - Imaging    XR cervical spine today, 4/16/2025  Official read pending.  New upright AP, lateral, flexion, and extension radiographs of the cervical spine were obtained in the office today.  These images are of good quality.  Demonstrated on these views is a lordotic cervical spine.  There is overall preservation of vertebral body and disc heights.  There is some mild anterior osteophytosis at the anterior inferior corner of the C5 vertebral body.  Otherwise, no significant osseous abnormality.  Prevertebral soft tissues appear grossly normal.      XR lumbar spine today, 4/16/2025  Official read pending.  New upright AP, lateral, flexion, and extension radiographs of the lumbar spine were obtained in the office today.  These images are of good quality.  Demonstrated on these views is a lordotic lumbar spine.  There is multilevel narrowing of the discs particular at L5-S1 and L4-5 as well as L3-4 which is mild.  There is no significant anterolisthesis.  There is very mild grade 1 retrolisthesis at L2-3.  Overall vertebral body and disc heights are otherwise maintained.  No acute fracture or dislocation.      MRI cervical spine, 7/10/2015  FINDINGS:   Alignment is normal.  Bone marrow signal and spinal cord signal is   within normal limits.      At level C2/3,  no significant disc bulge, herniation or central   stenosis present.  The neural foramina are patent bilaterally.  The   facet joints are within normal limits.      At level C3/4,  no significant disc bulge, herniation or central   stenosis present.  The neural foramina are patent bilaterally.  The   facet joints are within normal limits.      At level C4/5,  no significant disc bulge, herniation or central   stenosis present.  The neural foramina are patent bilaterally.  The   facet joints are within normal limits.       At level C5/6,  no significant disc bulge, herniation or central   stenosis present.  The neural foramina are patent bilaterally.  The   facet joints are within normal limits.      At level C6/7,  mild circumferential disc bulge, without significant   central canal or neuroforaminal stenosis.  The facet joints are   within normal limits.      At level C7/T1,  no significant disc bulge, herniation or central   stenosis present.  The neural foramina are patent bilaterally.  The   facet joints are within normal limits.      IMPRESSION:  Mild diffuse disc bulge at C6-C7 without significant central canal or   neuroforaminal stenosis.      Diagnosis  Encounter Diagnoses   Name Primary?    Neck pain Yes    Chronic bilateral low back pain with bilateral sciatica           Assessment/Plan  Mal Lamar is a 34-year-old nonbinary individual who presents for initial evaluation of chronic low back and bilateral lower extremity pain in addition to bilateral hand numbness and paresthesias.  Patient's symptoms have been chronic and longstanding for several months to years.  They were previously established with pain management back at [facility].  They have also been diagnosed with fibromyalgia and are currently establishing care with rheumatology.  They relate pain in a bandlike distribution in the lower back that then proceeds intermittently down bilateral lower extremities with numbness and paresthesias in the feet.  They also separately relate intermittent numbness and paresthesias of the hands with very mild decrease in dexterity.  We reviewed their imaging today in the office which demonstrates no acute osseous injury or abnormality.  There is some very mild degenerative changes in the lower lumbar and spine.  I also separately reviewed their MRI of the cervical spine from 2015 which did not demonstrate any severe spinal cord compression.    I had an extensive discussion in the office today with the patient with  regards to their symptoms, their imaging findings, and possible management options.  We did discuss the nature of their pain and its chronicity.  We discussed several pain generators for their lower back, including the very mild degenerative changes in addition to their elevated BMI and the extra load on their lower back.  They also do carry a diagnosis of fibromyalgia.  We also discussed the psychologic nature of chronic pain and how symptoms of anxiety and depression may also exacerbate things.  Patient relates significant struggles over the past several months to years with their functionality and their ability to cope with the symptoms.  They are seeing a psychologic provider to discuss some of these considerations over counseling.  I did encourage this.    I did recommend that the patient continue their evaluation management for a systemic fibromyalgia or PMR diagnosis.  I also encouraged their establishing care with a pain management provider to provide more longitudinal evaluation and management for their chronic pain in conjunction with the above providers.  I did place a referral to one of my colleagues, Dr. Young, for them.    From a spine surgical standpoint, I did not see any structural abnormalities in the XR evaluations today that would warrant more urgent evaluation or management.  With regards to the upper extremity numbness and paresthesias with mildly reduced dexterity symptoms, the patient did not have significant spinal cord compression on their MRI from 2015.  I did not appreciate any significant degenerative change on their x-rays in the interval time.  I did ask them to continue monitoring the symptoms.  If there is any significant worsening, they should reach out to my office or their pain management provider for a repeat MRI of the cervical spine.  They understood the above.    Patient can follow-up with my office on an as-needed basis if their symptoms fail to improve or worsen. After  our discussion, the patient articulated understanding of the plan and felt that all questions had been answered satisfactorily. The patient was pleased with the visit and very appreciative for the care rendered.    **Please excuse any errors in grammar or translation related to this dictation. Voice recognition software was utilized to prepare this document. **    F/u PRN.       Orders Placed This Encounter    XR cervical spine complete 4-5 views    XR lumbar spine 4+ views w flexion extension    Referral to Pain Medicine       --    Esteban Osborne MD  Orthopaedic Spine Surgery  , Department of Orthopaedic Surgery  [facility]                 [1]   Past Medical History:  Diagnosis Date    Abscess of nose 01/30/2024    Abscess, furuncle and carbuncle of nose 07/12/2018    Cellulitis of nose, external    Acute left ankle pain 01/30/2024    Allergic conjunctivitis of both eyes 01/30/2024    Anxiety disorder, unspecified 03/03/2021    Anxiety    Conjunctivitis 01/30/2024    DDD (degenerative disc disease), lumbar     Dental infection 01/30/2024    Diarrhea 01/30/2024    Fever, intermittent 01/30/2024    Hyperlipidemia     Hypertension     Otalgia 01/30/2024    Other general symptoms and signs 01/10/2017    Flu-like symptoms    Personal history of other diseases of the digestive system 03/03/2021    History of irritable bowel syndrome    Personal history of other diseases of the respiratory system 02/15/2016    History of acute bronchitis    Personal history of other mental and behavioral disorders 02/23/2022    History of depression    Personal history of other mental and behavioral disorders     History of autism    Pharyngitis with viral syndrome 01/30/2024    Sore throat 01/30/2024    Strain of left ankle 01/30/2024   [2]   Past Surgical History:  Procedure Laterality Date    LITHOTRIPSY  2024    MOUTH SURGERY  01/14/2016    Oral Surgery Tooth Extraction   [3]    Social History  Socioeconomic History    Marital status: Single   Tobacco Use    Smoking status: Former     Current packs/day: 0.00     Average packs/day: 1 pack/day for 18.0 years (18.0 ttl pk-yrs)     Types: Cigarettes     Start date: 2005     Quit date: 2023     Years since quittin.1    Smokeless tobacco: Never   Vaping Use    Vaping status: Former    Quit date: 2024    Substances: Nicotine   Substance and Sexual Activity    Alcohol use: Yes     Comment: SOCIAL    Drug use: Yes     Types: Marijuana     Comment: THC GUMMIES RECREATIONAL - LAST USED 2024     Social Drivers of Health     Financial Resource Strain: Not on File (2021)    Received from [employer]    Financial Resource Strain     Financial Resource Strain: 0   Food Insecurity: Not on File (2024)    Received from [employer]    Food Insecurity     Food: 0   Transportation Needs: Not on File (2021)    Received from [employer]    Transportation Needs     Transportation: 0   Physical Activity: Not on File (2021)    Received from [employer]    Physical Activity     Physical Activity: 0   Stress: Not on File (2021)    Received from [employer]    Stress     Stress: 0   Social Connections: Not on File (2024)    Received from [employer]    Social Connections     Connectedness: 0   Housing Stability: Not on File (2021)    Received from [employer]    Housing Stability     Housin   [4]   Family History  Problem Relation Name Age of Onset    Asthma Sister      Crohn's disease Brother     [5]   Allergies  Allergen Reactions    Shellfish Derived Hives    Adhesive Tape-Silicones Itching and Rash

## 2025-04-19 DIAGNOSIS — M79.7 FIBROMYALGIA: ICD-10-CM

## 2025-04-22 RX ORDER — CYCLOBENZAPRINE HCL 10 MG
10 TABLET ORAL 3 TIMES DAILY PRN
Qty: 90 TABLET | Refills: 1 | Status: SHIPPED | OUTPATIENT
Start: 2025-04-22 | End: 2025-04-23 | Stop reason: SDUPTHER

## 2025-04-23 DIAGNOSIS — M79.7 FIBROMYALGIA: ICD-10-CM

## 2025-04-23 RX ORDER — CYCLOBENZAPRINE HCL 10 MG
10 TABLET ORAL 3 TIMES DAILY PRN
Qty: 90 TABLET | Refills: 0 | Status: SHIPPED | OUTPATIENT
Start: 2025-04-23

## 2025-05-09 ENCOUNTER — OFFICE VISIT (OUTPATIENT)
Dept: PAIN MEDICINE | Facility: CLINIC | Age: 35
End: 2025-05-09
Payer: MEDICARE

## 2025-05-09 VITALS — DIASTOLIC BLOOD PRESSURE: 70 MMHG | HEART RATE: 97 BPM | OXYGEN SATURATION: 96 % | SYSTOLIC BLOOD PRESSURE: 132 MMHG

## 2025-05-09 DIAGNOSIS — G89.29 CHRONIC BILATERAL LOW BACK PAIN WITH BILATERAL SCIATICA: ICD-10-CM

## 2025-05-09 DIAGNOSIS — M54.42 CHRONIC BILATERAL LOW BACK PAIN WITH BILATERAL SCIATICA: ICD-10-CM

## 2025-05-09 DIAGNOSIS — M54.41 CHRONIC BILATERAL LOW BACK PAIN WITH BILATERAL SCIATICA: ICD-10-CM

## 2025-05-09 DIAGNOSIS — E66.01 MORBID OBESITY (MULTI): ICD-10-CM

## 2025-05-09 DIAGNOSIS — M54.16 LUMBAR RADICULOPATHY: Primary | ICD-10-CM

## 2025-05-09 PROCEDURE — 3075F SYST BP GE 130 - 139MM HG: CPT | Performed by: ANESTHESIOLOGY

## 2025-05-09 PROCEDURE — 3078F DIAST BP <80 MM HG: CPT | Performed by: ANESTHESIOLOGY

## 2025-05-09 PROCEDURE — 99204 OFFICE O/P NEW MOD 45 MIN: CPT | Performed by: ANESTHESIOLOGY

## 2025-05-09 PROCEDURE — G2211 COMPLEX E/M VISIT ADD ON: HCPCS | Performed by: ANESTHESIOLOGY

## 2025-05-09 PROCEDURE — 99214 OFFICE O/P EST MOD 30 MIN: CPT | Performed by: ANESTHESIOLOGY

## 2025-05-09 PROCEDURE — 99401 PREV MED CNSL INDIV APPRX 15: CPT | Performed by: ANESTHESIOLOGY

## 2025-05-09 RX ORDER — METHYLPREDNISOLONE 4 MG/1
TABLET ORAL
Qty: 21 TABLET | Refills: 0 | Status: SHIPPED | OUTPATIENT
Start: 2025-05-09 | End: 2025-05-15

## 2025-05-09 RX ORDER — PREGABALIN 50 MG/1
CAPSULE ORAL
Qty: 60 CAPSULE | Refills: 0 | Status: SHIPPED | OUTPATIENT
Start: 2025-05-09

## 2025-05-09 ASSESSMENT — LIFESTYLE VARIABLES: TOTAL SCORE: 2

## 2025-05-09 ASSESSMENT — ENCOUNTER SYMPTOMS
DEPRESSION: 0
NUMBNESS: 1
LOSS OF SENSATION IN FEET: 0
WEAKNESS: 0
OCCASIONAL FEELINGS OF UNSTEADINESS: 0
GASTROINTESTINAL NEGATIVE: 1
EYES NEGATIVE: 1
CONSTITUTIONAL NEGATIVE: 1
CARDIOVASCULAR NEGATIVE: 1
PSYCHIATRIC NEGATIVE: 1
BACK PAIN: 1
RESPIRATORY NEGATIVE: 1
HEMATOLOGIC/LYMPHATIC NEGATIVE: 1
ENDOCRINE NEGATIVE: 1

## 2025-05-09 ASSESSMENT — PAIN SCALES - GENERAL
PAINLEVEL_OUTOF10: 5 - MODERATE PAIN
PAINLEVEL_OUTOF10: 5

## 2025-05-09 ASSESSMENT — PATIENT HEALTH QUESTIONNAIRE - PHQ9
SUM OF ALL RESPONSES TO PHQ9 QUESTIONS 1 AND 2: 0
2. FEELING DOWN, DEPRESSED OR HOPELESS: NOT AT ALL
1. LITTLE INTEREST OR PLEASURE IN DOING THINGS: NOT AT ALL

## 2025-05-09 ASSESSMENT — PAIN - FUNCTIONAL ASSESSMENT: PAIN_FUNCTIONAL_ASSESSMENT: 0-10

## 2025-05-09 ASSESSMENT — PAIN DESCRIPTION - DESCRIPTORS: DESCRIPTORS: ACHING;STABBING

## 2025-05-09 NOTE — PROGRESS NOTES
PAIN MANAGEMENT NEW PATIENT OFFICE NOTE    Date of Service: 5/9/2025    SUBJECTIVE    CHIEF COMPLAINT: LBP    HISTORY OF PRESENT ILLNESS    Bree Lamar is a 34 y.o. adult with PMH HTN, morbid obesity, former smoker, marijuana use, GERD who presents as new patient referred by ortho spine Dr Osborne with LB pain.    Pt describes LBP pain since 2020 without inciting trauma/incident. 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 Pain radiates to BL hips. Pain is worse with standing, walking, prolonged sitting. Pain assoc with BLE numbness. Pt has tried Tylenol, ibuprofen, cyclobenzaprine, duloxetine, topiramate, >4 mo PT. Saw Dr Osborne on 4/16 who rec'd conservative mgmt. Seeking rheumatology.    Pt denies new-onset weakness, bowel/bladder incontinence.  Pt denies recent infection, allergy to Latex/iodine/contrast. Patient is currently taking the following blood thinner(s): N/A    REVIEW OF SYSTEMS  Review of Systems   Constitutional: Negative.    HENT: Negative.     Eyes: Negative.    Respiratory: Negative.     Cardiovascular: Negative.    Gastrointestinal: Negative.    Endocrine: Negative.    Musculoskeletal:  Positive for back pain.   Skin: Negative.    Neurological:  Positive for numbness. Negative for weakness.   Hematological: Negative.    Psychiatric/Behavioral: Negative.         PAST MEDICAL HISTORY  Medical History[1]  Surgical History[2]  Family History[3]    CURRENT MEDICATIONS  Current Medications[4]    ALLERGIES AND DRUG REACTIONS  Allergies[5]       OBJECTIVE  Visit Vitals  /70   Pulse 97   SpO2 96%   OB Status OCP   Smoking Status Former       Last Recorded Pain Score (if available):           Pain Score:   5     Physical Exam  Vitals and nursing note reviewed.       General: Sitting in chair, NAD, ambulating with cane  Head: NCAT  Eyes: Sclera/conjunctiva clear, EOMI, PERRL  Nose/mouth: MMM  CV: Good distal pulses  Lungs: Good/equal  chest excursion  Abdomen: Soft, ND  Ext: No cyanosis/edema  MSK: L-spine alignment: unremarkable, BL paraspinal m TTP, Lspine ROM: flexion/extension assoc with pain    Neuro: AAOx3, CN grossly nl  Dermatome sensation to light touch  LEFT L1 (lower pelvis/upper thigh): WNL    RIGHT L1: WNL      LEFT L2 (upper thigh): WNL       RIGHT: L2:WNL      LEFT L3 (medial knee): WNL       RIGHT L3: WNL      LEFT L4 (superior medial malleolus): WNL       RIGHT L4: WNL      LEFT L5 (dorsal foot): WNL       RIGHT L5: WNL      LEFT S1 (lateral foot): WNL     RIGHT S1: WNL      LEFT S2 (popliteal fossa): WNL    RIGHT S2: WNL        Motor strength  LEFT L2 (hip flexion): 5/5   RIGHT L2: 5/5  LEFT L3 (knee extension): 5/5     RIGHT L3: 5/5  LEFT L4 (dorsiflexion): 5/5     RIGHT L4: 5/5  LEFT L5 (EHL extension): 5/5     RIGHT L5: 5/5  LEFT S1 (plantarflexion): 5/5     RIGHT S1: 5/5  LEFT S2 (knee flexion): 5/5      RIGHT S2: 5/5    Special testing  DTR unremarkable  Seated slump test neg BL  Clonus: neg BL  Babinski: neg BL    Psych: affect nl  Skin: no rash/lesions      REVIEW OF LABORATORY DATA  I have reviewed the following lab results:  WBC   Date Value Ref Range Status   12/21/2024 9.7 4.4 - 11.3 x10*3/uL Final     RBC   Date Value Ref Range Status   12/21/2024 4.66 4.00 - 5.90 x10*6/uL Final     Hemoglobin   Date Value Ref Range Status   12/21/2024 12.7 12.0 - 17.5 g/dL Final     Hematocrit   Date Value Ref Range Status   12/21/2024 39.9 36.0 - 52.0 % Final     MCV   Date Value Ref Range Status   12/21/2024 86 80 - 100 fL Final     MCH   Date Value Ref Range Status   12/21/2024 27.3 26.0 - 34.0 pg Final     MCHC   Date Value Ref Range Status   12/21/2024 31.8 (L) 32.0 - 36.0 g/dL Final     RDW   Date Value Ref Range Status   12/21/2024 13.9 11.5 - 14.5 % Final     Platelets   Date Value Ref Range Status   12/21/2024 437 150 - 450 x10*3/uL Final     Sodium   Date Value Ref Range Status   12/19/2024 135 (L) 136 - 145 mmol/L Final  "    Potassium   Date Value Ref Range Status   12/19/2024 4.1 3.5 - 5.3 mmol/L Final     Bicarbonate   Date Value Ref Range Status   12/19/2024 19 (L) 21 - 32 mmol/L Final     Urea Nitrogen   Date Value Ref Range Status   12/19/2024 11 6 - 23 mg/dL Final     Calcium   Date Value Ref Range Status   12/19/2024 9.3 8.6 - 10.3 mg/dL Final     No results found for: \"PROTIME\", \"PTT\", \"INR\", \"FIBRINOGEN\"      REVIEW OF RADIOLOGY   I have reviewed the following:  Radiology Studies           MRI L[facility]spine 12/6/23 at [facility]:  Counting reference:  Lumbosacral junction.  For the purposes of this   report,  the mid L4 vertebral body is considered the level of the iliac   crest and assume there are 5 lumbar-type vertebrae.  Anatomic variant:    None.     Localizer images:  No additional findings.     Alignment:    Low-grade retrolisthesis of L4 on L5.     Bone marrow signal/fracture:  No evidence of pathologic marrow   infiltration.  No evidence of prior fracture.  Mild degenerative disc   disease with disc desiccation and mild height loss at L3-4.     Conus: The conus terminates at L1-L2 and is within normal limits of   morphology and signal.  Normal course and caliber of the descending cauda   equina nerve roots.     Paraspinal soft tissues:   Paraspinal soft tissues are within normal   limits.     Lower thoracic spine:  Visualized lower thoracic canal and foramina are   patent.     L1-L2:   Canal and foramina are patent.     L2-L3:    Canal and foramina are patent     L3-L4:    Canal and foramina are patent     L4-L5:    Canal and foramina are patent.  Mild bilateral facet   arthropathy.     L5-S1:   Canal and foramina are patent.  Moderate bilateral facet   arthropathy.     Sacrum and iliac wings:   The visualized sacrum and iliac wings are   within normal limits.     IMPRESSION:     Minimal degenerative spondylosis.  No high-grade lumbar spinal canal or   neuroforaminal stenosis.     Anatomic Lumbar Variant: None.  The mid L4 " vertebral body is considered   the level of the iliac crest and assume there are 5 lumbar-type vertebrae.          ASSESSMENT & PLAN  Bree Lamar is a 34 y.o. adult with PMH HTN, morbid obesity, former smoker, marijuana use, GERD who presents as new patient referred by ortho spine Dr Osborne with LB pain.    1) LBP  -Since 2020 with radiation to BL hips assoc with BLE numbness w/o obj deficit  -Refractive to yrs of conservative tx including Tylenol, ibuprofen, cyclobenzaprine, duloxetine, topiramate, >4 mo PT  -Reviewed/discussed MRI L-spine 12/6/23 at [facility]: multilevel spondylosis featuring L5-S1 disc complex contributing to stenosis  -Saw Dr Osborne 4/16/25 who rec'd conservative mgmt.   -F/U rheum  -Schedule diagnostic/therapeutic L5-S1 ILESI to target pain generator as seen on imaging and minimize risk/likelihood of chronic opioid use and/or surgery    2) Morbid obesity  Discussed pt's obesity: BMI 53.1. Discussed its potential affect on worsening chronic pain through mechanical stress as well as neuro-inflammatory chemicals. This is in addition to contribution to metabolic syndrome and overal health and perioperative risk. Counseled on wt loss strategy. Rec pt discuss Zepbound with PCP            Discussed procedure risks/benefits in detail with patient. Pt meets medical necessity for procedure due to failure of conservative measures. Reviewed procedural risks including bleeding, infection, nerve damage, paralysis. Also reviewed mitigating factors such as screening for infection/blood thinner use, sterile precautions, and image-guidance when applicable. All questions answered. Pt/guardian expressed understanding and choose to proceed    Today's visit involved establishment of care and a complete examination with review of records. In the context of the complexity of this patient's  chronic pain diagnosis, long-term expectations and care planning discussed. Imaging studies ordered are placed do elucidate the  patient's diagnosis, but also to evaluate the patient's candidacy for procedural and surgical interventions. The risks and benefits of these potential interventions are detailed as above.         Chhaya Young MD  Anesthesiologist & Interventional Pain Physician   [facility]  O: [phone]  F: [phone]  9:53 AM  05/09/25         [1]   Past Medical History:  Diagnosis Date    Abscess of nose 01/30/2024    Abscess, furuncle and carbuncle of nose 07/12/2018    Cellulitis of nose, external    Acute left ankle pain 01/30/2024    Allergic conjunctivitis of both eyes 01/30/2024    Anxiety disorder, unspecified 03/03/2021    Anxiety    Conjunctivitis 01/30/2024    DDD (degenerative disc disease), lumbar     Dental infection 01/30/2024    Diarrhea 01/30/2024    Fever, intermittent 01/30/2024    Hyperlipidemia     Hypertension     Otalgia 01/30/2024    Other general symptoms and signs 01/10/2017    Flu-like symptoms    Personal history of other diseases of the digestive system 03/03/2021    History of irritable bowel syndrome    Personal history of other diseases of the respiratory system 02/15/2016    History of acute bronchitis    Personal history of other mental and behavioral disorders 02/23/2022    History of depression    Personal history of other mental and behavioral disorders     History of autism    Pharyngitis with viral syndrome 01/30/2024    Sore throat 01/30/2024    Strain of left ankle 01/30/2024   [2]   Past Surgical History:  Procedure Laterality Date    LITHOTRIPSY  2024    MOUTH SURGERY  01/14/2016    Oral Surgery Tooth Extraction   [3]   Family History  Problem Relation Name Age of Onset    Asthma Sister      Crohn's disease Brother     [4]   Current Outpatient Medications   Medication Sig Dispense Refill    albuterol (ProAir HFA) 90 mcg/actuation inhaler Inhale 2 puffs every 6 hours if needed for shortness of breath. 18 g 2    carvedilol (Coreg) 12.5 mg tablet Take 1 tablet by  mouth twice daily 60 tablet 11    cetirizine (ZyrTEC) 10 mg tablet Take 1 tablet (10 mg) by mouth 2 times a day.      cholecalciferol (Vitamin D-3) 5,000 Units tablet Take 1 tablet (125 mcg) by mouth once daily.      cyclobenzaprine (Flexeril) 10 mg tablet Take 1 tablet (10 mg) by mouth 3 times a day as needed for muscle spasms. 90 tablet 0    DULoxetine (Cymbalta) 30 mg DR capsule Take 1 capsule (30 mg) by mouth once daily.      DULoxetine (Cymbalta) 60 mg DR capsule Take 1 capsule (60 mg) by mouth once daily.      hydrOXYzine pamoate (Vistaril) 50 mg capsule Take 1 capsule (50 mg) by mouth 3 times a day as needed for anxiety.      LORazepam (Ativan) 0.5 mg tablet Take 1 tablet (0.5 mg) by mouth once daily as needed for anxiety.      Loryna, 28, 3-0.02 mg tablet Take by mouth once daily.      ondansetron (Zofran) 4 mg tablet TAKE 1 TABLET BY MOUTH EVERY 8 HOURS AS NEEDED FOR NAUSEA OR VOMITING 20 tablet 0    pantoprazole (ProtoNix) 40 mg EC tablet TAKE 1 TABLET BY MOUTH TWICE DAILY . SWALLOW WHOLE, DO NOT CRUSH, SPLIT, OR CHEW. 60 tablet 0    rosuvastatin (Crestor) 10 mg tablet Take 1 tablet (10 mg) by mouth once daily at bedtime. 100 tablet 3    SUMAtriptan (Imitrex) 100 mg tablet Take 1 tablet (100 mg) by mouth 1 time if needed for migraine. PRN 9 tablet 0    topiramate (Topamax) 50 mg tablet Take 1 tablet (50 mg) by mouth 2 times a day. 200 tablet 1    traZODone (Desyrel) 100 mg tablet Take 1 tablet (100 mg) by mouth once daily at bedtime.      triamcinolone (Kenalog) 0.1 % ointment        No current facility-administered medications for this visit.   [5]   Allergies  Allergen Reactions    Shellfish Derived Hives    Adhesive Tape-Silicones Itching and Rash

## 2025-05-09 NOTE — LETTER
May 12, 2025     Esteban ROSARIO MD  [address] [address]  [place] [address]    Patient: Mal Lamar   YOB: 1990   Date of Visit: 5/9/2025       Dear Dr. Esteban ROSARIO MD:    Thank you for referring Mal Lamar to me for evaluation. Below are my notes for this consultation.  If you have questions, please do not hesitate to call me. I look forward to following your patient along with you.       Sincerely,     Chhaya Young MD      CC: No Recipients  ______________________________________________________________________________________    PAIN MANAGEMENT NEW PATIENT OFFICE NOTE    Date of Service: 5/9/2025    SUBJECTIVE    CHIEF COMPLAINT: LBP    HISTORY OF PRESENT ILLNESS    Bree Lamar is a 34 y.o. adult with PMH HTN, morbid obesity, former smoker, marijuana use, GERD who presents as new patient referred by ortho spine Dr Osborne with LB pain.    Pt describes LBP pain since 2020 without inciting trauma/incident. 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 Pain radiates to BL hips. Pain is worse with standing, walking, prolonged sitting. Pain assoc with BLE numbness. Pt has tried Tylenol, ibuprofen, cyclobenzaprine, duloxetine, topiramate, >4 mo PT. Saw Dr Osborne on 4/16 who rec'd conservative mgmt. Seeking rheumatology.    Pt denies new-onset weakness, bowel/bladder incontinence.  Pt denies recent infection, allergy to Latex/iodine/contrast. Patient is currently taking the following blood thinner(s): N/A    REVIEW OF SYSTEMS  Review of Systems   Constitutional: Negative.    HENT: Negative.     Eyes: Negative.    Respiratory: Negative.     Cardiovascular: Negative.    Gastrointestinal: Negative.    Endocrine: Negative.    Musculoskeletal:  Positive for back pain.   Skin: Negative.    Neurological:  Positive for numbness. Negative for weakness.   Hematological: Negative.    Psychiatric/Behavioral: Negative.         PAST  MEDICAL HISTORY  Medical History[1]  Surgical History[2]  Family History[3]    CURRENT MEDICATIONS  Current Medications[4]    ALLERGIES AND DRUG REACTIONS  Allergies[5]       OBJECTIVE  Visit Vitals  /70   Pulse 97   SpO2 96%   OB Status OCP   Smoking Status Former       Last Recorded Pain Score (if available):           Pain Score:   5     Physical Exam  Vitals and nursing note reviewed.       General: Sitting in chair, NAD, ambulating with cane  Head: NCAT  Eyes: Sclera/conjunctiva clear, EOMI, PERRL  Nose/mouth: MMM  CV: Good distal pulses  Lungs: Good/equal chest excursion  Abdomen: Soft, ND  Ext: No cyanosis/edema  MSK: L-spine alignment: unremarkable, BL paraspinal m TTP, Lspine ROM: flexion/extension assoc with pain    Neuro: AAOx3, CN grossly nl  Dermatome sensation to light touch  LEFT L1 (lower pelvis/upper thigh): WNL    RIGHT L1: WNL      LEFT L2 (upper thigh): WNL       RIGHT: L2:WNL      LEFT L3 (medial knee): WNL       RIGHT L3: WNL      LEFT L4 (superior medial malleolus): WNL       RIGHT L4: WNL      LEFT L5 (dorsal foot): WNL       RIGHT L5: WNL      LEFT S1 (lateral foot): WNL     RIGHT S1: WNL      LEFT S2 (popliteal fossa): WNL    RIGHT S2: WNL        Motor strength  LEFT L2 (hip flexion): 5/5   RIGHT L2: 5/5  LEFT L3 (knee extension): 5/5     RIGHT L3: 5/5  LEFT L4 (dorsiflexion): 5/5     RIGHT L4: 5/5  LEFT L5 (EHL extension): 5/5     RIGHT L5: 5/5  LEFT S1 (plantarflexion): 5/5     RIGHT S1: 5/5  LEFT S2 (knee flexion): 5/5      RIGHT S2: 5/5    Special testing  DTR unremarkable  Seated slump test neg BL  Clonus: neg BL  Babinski: neg BL    Psych: affect nl  Skin: no rash/lesions      REVIEW OF LABORATORY DATA  I have reviewed the following lab results:  WBC   Date Value Ref Range Status   12/21/2024 9.7 4.4 - 11.3 x10*3/uL Final     RBC   Date Value Ref Range Status   12/21/2024 4.66 4.00 - 5.90 x10*6/uL Final     Hemoglobin   Date Value Ref Range Status   12/21/2024 12.7 12.0 - 17.5  "g/dL Final     Hematocrit   Date Value Ref Range Status   12/21/2024 39.9 36.0 - 52.0 % Final     MCV   Date Value Ref Range Status   12/21/2024 86 80 - 100 fL Final     MCH   Date Value Ref Range Status   12/21/2024 27.3 26.0 - 34.0 pg Final     MCHC   Date Value Ref Range Status   12/21/2024 31.8 (L) 32.0 - 36.0 g/dL Final     RDW   Date Value Ref Range Status   12/21/2024 13.9 11.5 - 14.5 % Final     Platelets   Date Value Ref Range Status   12/21/2024 437 150 - 450 x10*3/uL Final     Sodium   Date Value Ref Range Status   12/19/2024 135 (L) 136 - 145 mmol/L Final     Potassium   Date Value Ref Range Status   12/19/2024 4.1 3.5 - 5.3 mmol/L Final     Bicarbonate   Date Value Ref Range Status   12/19/2024 19 (L) 21 - 32 mmol/L Final     Urea Nitrogen   Date Value Ref Range Status   12/19/2024 11 6 - 23 mg/dL Final     Calcium   Date Value Ref Range Status   12/19/2024 9.3 8.6 - 10.3 mg/dL Final     No results found for: \"PROTIME\", \"PTT\", \"INR\", \"FIBRINOGEN\"      REVIEW OF RADIOLOGY   I have reviewed the following:  Radiology Studies           MRI L[facility]spine 12/6/23 [facility]:  Counting reference:  Lumbosacral junction.  For the purposes of this   report,  the mid L4 vertebral body is considered the level of the iliac   crest and assume there are 5 lumbar-type vertebrae.  Anatomic variant:    None.     Localizer images:  No additional findings.     Alignment:    Low-grade retrolisthesis of L4 on L5.     Bone marrow signal/fracture:  No evidence of pathologic marrow   infiltration.  No evidence of prior fracture.  Mild degenerative disc   disease with disc desiccation and mild height loss at L3-4.     Conus: The conus terminates at L1-L2 and is within normal limits of   morphology and signal.  Normal course and caliber of the descending cauda   equina nerve roots.     Paraspinal soft tissues:   Paraspinal soft tissues are within normal   limits.     Lower thoracic spine:  Visualized lower thoracic canal and foramina are "   patent.     L1-L2:   Canal and foramina are patent.     L2-L3:    Canal and foramina are patent     L3-L4:    Canal and foramina are patent     L4-L5:    Canal and foramina are patent.  Mild bilateral facet   arthropathy.     L5-S1:   Canal and foramina are patent.  Moderate bilateral facet   arthropathy.     Sacrum and iliac wings:   The visualized sacrum and iliac wings are   within normal limits.     IMPRESSION:     Minimal degenerative spondylosis.  No high-grade lumbar spinal canal or   neuroforaminal stenosis.     Anatomic Lumbar Variant: None.  The mid L4 vertebral body is considered   the level of the iliac crest and assume there are 5 lumbar-type vertebrae.          ASSESSMENT & PLAN  Bree Lamar is a 34 y.o. adult with PMH HTN, morbid obesity, former smoker, marijuana use, GERD who presents as new patient referred by ortho spine Dr Osborne with LB pain.    1) LBP  -Since 2020 with radiation to BL hips assoc with BLE numbness w/o obj deficit  -Refractive to yrs of conservative tx including Tylenol, ibuprofen, cyclobenzaprine, duloxetine, topiramate, >4 mo PT  -Reviewed/discussed MRI L-spine 12/6/23 at [facility]: multilevel spondylosis featuring L5-S1 disc complex contributing to stenosis  -Saw Dr Osborne 4/16/25 who rec'd conservative mgmt.   -F/U rheum  -Schedule diagnostic/therapeutic L5-S1 ILESI to target pain generator as seen on imaging and minimize risk/likelihood of chronic opioid use and/or surgery    2) Morbid obesity  Discussed pt's obesity: BMI 53.1. Discussed its potential affect on worsening chronic pain through mechanical stress as well as neuro-inflammatory chemicals. This is in addition to contribution to metabolic syndrome and overal health and perioperative risk. Counseled on wt loss strategy. Rec pt discuss Zepbound with PCP            Discussed procedure risks/benefits in detail with patient. Pt meets medical necessity for procedure due to failure of conservative measures. Reviewed  procedural risks including bleeding, infection, nerve damage, paralysis. Also reviewed mitigating factors such as screening for infection/blood thinner use, sterile precautions, and image-guidance when applicable. All questions answered. Pt/guardian expressed understanding and choose to proceed    Today's visit involved establishment of care and a complete examination with review of records. In the context of the complexity of this patient's  chronic pain diagnosis, long-term expectations and care planning discussed. Imaging studies ordered are placed do elucidate the patient's diagnosis, but also to evaluate the patient's candidacy for procedural and surgical interventions. The risks and benefits of these potential interventions are detailed as above.         Chhaya Young MD  Anesthesiologist & Interventional Pain Physician   [facility]  O: [phone]  F: [phone]  9:53 AM  05/09/25         [1]  Past Medical History:  Diagnosis Date   • Abscess of nose 01/30/2024   • Abscess, furuncle and carbuncle of nose 07/12/2018    Cellulitis of nose, external   • Acute left ankle pain 01/30/2024   • Allergic conjunctivitis of both eyes 01/30/2024   • Anxiety disorder, unspecified 03/03/2021    Anxiety   • Conjunctivitis 01/30/2024   • DDD (degenerative disc disease), lumbar    • Dental infection 01/30/2024   • Diarrhea 01/30/2024   • Fever, intermittent 01/30/2024   • Hyperlipidemia    • Hypertension    • Otalgia 01/30/2024   • Other general symptoms and signs 01/10/2017    Flu-like symptoms   • Personal history of other diseases of the digestive system 03/03/2021    History of irritable bowel syndrome   • Personal history of other diseases of the respiratory system 02/15/2016    History of acute bronchitis   • Personal history of other mental and behavioral disorders 02/23/2022    History of depression   • Personal history of other mental and behavioral disorders     History of autism   • Pharyngitis  with viral syndrome 01/30/2024   • Sore throat 01/30/2024   • Strain of left ankle 01/30/2024   [2]  Past Surgical History:  Procedure Laterality Date   • LITHOTRIPSY  2024   • MOUTH SURGERY  01/14/2016    Oral Surgery Tooth Extraction   [3]  Family History  Problem Relation Name Age of Onset   • Asthma Sister     • Crohn's disease Brother     [4]  Current Outpatient Medications   Medication Sig Dispense Refill   • albuterol (ProAir HFA) 90 mcg/actuation inhaler Inhale 2 puffs every 6 hours if needed for shortness of breath. 18 g 2   • carvedilol (Coreg) 12.5 mg tablet Take 1 tablet by mouth twice daily 60 tablet 11   • cetirizine (ZyrTEC) 10 mg tablet Take 1 tablet (10 mg) by mouth 2 times a day.     • cholecalciferol (Vitamin D-3) 5,000 Units tablet Take 1 tablet (125 mcg) by mouth once daily.     • cyclobenzaprine (Flexeril) 10 mg tablet Take 1 tablet (10 mg) by mouth 3 times a day as needed for muscle spasms. 90 tablet 0   • DULoxetine (Cymbalta) 30 mg DR capsule Take 1 capsule (30 mg) by mouth once daily.     • DULoxetine (Cymbalta) 60 mg DR capsule Take 1 capsule (60 mg) by mouth once daily.     • hydrOXYzine pamoate (Vistaril) 50 mg capsule Take 1 capsule (50 mg) by mouth 3 times a day as needed for anxiety.     • LORazepam (Ativan) 0.5 mg tablet Take 1 tablet (0.5 mg) by mouth once daily as needed for anxiety.     • Loryna, 28, 3-0.02 mg tablet Take by mouth once daily.     • ondansetron (Zofran) 4 mg tablet TAKE 1 TABLET BY MOUTH EVERY 8 HOURS AS NEEDED FOR NAUSEA OR VOMITING 20 tablet 0   • pantoprazole (ProtoNix) 40 mg EC tablet TAKE 1 TABLET BY MOUTH TWICE DAILY . SWALLOW WHOLE, DO NOT CRUSH, SPLIT, OR CHEW. 60 tablet 0   • rosuvastatin (Crestor) 10 mg tablet Take 1 tablet (10 mg) by mouth once daily at bedtime. 100 tablet 3   • SUMAtriptan (Imitrex) 100 mg tablet Take 1 tablet (100 mg) by mouth 1 time if needed for migraine. PRN 9 tablet 0   • topiramate (Topamax) 50 mg tablet Take 1 tablet (50 mg) by  mouth 2 times a day. 200 tablet 1   • traZODone (Desyrel) 100 mg tablet Take 1 tablet (100 mg) by mouth once daily at bedtime.     • triamcinolone (Kenalog) 0.1 % ointment        No current facility-administered medications for this visit.   [5]  Allergies  Allergen Reactions   • Shellfish Derived Hives   • Adhesive Tape-Silicones Itching and Rash        [1]  Past Medical History:  Diagnosis Date   • Abscess of nose 01/30/2024   • Abscess, furuncle and carbuncle of nose 07/12/2018    Cellulitis of nose, external   • Acute left ankle pain 01/30/2024   • Allergic conjunctivitis of both eyes 01/30/2024   • Anxiety disorder, unspecified 03/03/2021    Anxiety   • Conjunctivitis 01/30/2024   • DDD (degenerative disc disease), lumbar    • Dental infection 01/30/2024   • Diarrhea 01/30/2024   • Fever, intermittent 01/30/2024   • Hyperlipidemia    • Hypertension    • Otalgia 01/30/2024   • Other general symptoms and signs 01/10/2017    Flu-like symptoms   • Personal history of other diseases of the digestive system 03/03/2021    History of irritable bowel syndrome   • Personal history of other diseases of the respiratory system 02/15/2016    History of acute bronchitis   • Personal history of other mental and behavioral disorders 02/23/2022    History of depression   • Personal history of other mental and behavioral disorders     History of autism   • Pharyngitis with viral syndrome 01/30/2024   • Sore throat 01/30/2024   • Strain of left ankle 01/30/2024   [2]  Past Surgical History:  Procedure Laterality Date   • LITHOTRIPSY  2024   • MOUTH SURGERY  01/14/2016    Oral Surgery Tooth Extraction   [3]  Family History  Problem Relation Name Age of Onset   • Asthma Sister     • Crohn's disease Brother     [4]  Current Outpatient Medications   Medication Sig Dispense Refill   • albuterol (ProAir HFA) 90 mcg/actuation inhaler Inhale 2 puffs every 6 hours if needed for shortness of breath. 18 g 2   • carvedilol (Coreg) 12.5 mg  tablet Take 1 tablet by mouth twice daily 60 tablet 11   • cetirizine (ZyrTEC) 10 mg tablet Take 1 tablet (10 mg) by mouth 2 times a day.     • cholecalciferol (Vitamin D-3) 5,000 Units tablet Take 1 tablet (125 mcg) by mouth once daily.     • cyclobenzaprine (Flexeril) 10 mg tablet Take 1 tablet (10 mg) by mouth 3 times a day as needed for muscle spasms. 90 tablet 0   • DULoxetine (Cymbalta) 30 mg DR capsule Take 1 capsule (30 mg) by mouth once daily.     • DULoxetine (Cymbalta) 60 mg DR capsule Take 1 capsule (60 mg) by mouth once daily.     • hydrOXYzine pamoate (Vistaril) 50 mg capsule Take 1 capsule (50 mg) by mouth 3 times a day as needed for anxiety.     • LORazepam (Ativan) 0.5 mg tablet Take 1 tablet (0.5 mg) by mouth once daily as needed for anxiety.     • Loryna, 28, 3-0.02 mg tablet Take by mouth once daily.     • ondansetron (Zofran) 4 mg tablet TAKE 1 TABLET BY MOUTH EVERY 8 HOURS AS NEEDED FOR NAUSEA OR VOMITING 20 tablet 0   • pantoprazole (ProtoNix) 40 mg EC tablet TAKE 1 TABLET BY MOUTH TWICE DAILY . SWALLOW WHOLE, DO NOT CRUSH, SPLIT, OR CHEW. 60 tablet 0   • rosuvastatin (Crestor) 10 mg tablet Take 1 tablet (10 mg) by mouth once daily at bedtime. 100 tablet 3   • SUMAtriptan (Imitrex) 100 mg tablet Take 1 tablet (100 mg) by mouth 1 time if needed for migraine. PRN 9 tablet 0   • topiramate (Topamax) 50 mg tablet Take 1 tablet (50 mg) by mouth 2 times a day. 200 tablet 1   • traZODone (Desyrel) 100 mg tablet Take 1 tablet (100 mg) by mouth once daily at bedtime.     • triamcinolone (Kenalog) 0.1 % ointment        No current facility-administered medications for this visit.   [5]  Allergies  Allergen Reactions   • Shellfish Derived Hives   • Adhesive Tape-Silicones Itching and Rash

## 2025-05-11 DIAGNOSIS — K30 FUNCTIONAL DYSPEPSIA: ICD-10-CM

## 2025-05-12 PROBLEM — M54.42 CHRONIC BILATERAL LOW BACK PAIN WITH BILATERAL SCIATICA: Status: ACTIVE | Noted: 2025-05-12

## 2025-05-12 PROBLEM — G89.29 CHRONIC BILATERAL LOW BACK PAIN WITH BILATERAL SCIATICA: Status: ACTIVE | Noted: 2025-05-12

## 2025-05-12 PROBLEM — M54.16 LUMBAR RADICULOPATHY: Status: ACTIVE | Noted: 2025-05-12

## 2025-05-12 PROBLEM — M54.41 CHRONIC BILATERAL LOW BACK PAIN WITH BILATERAL SCIATICA: Status: ACTIVE | Noted: 2025-05-12

## 2025-05-12 NOTE — ADDENDUM NOTE
Addended by: RAFAEL SHERMAN on: 5/12/2025 05:41 PM     Modules accepted: Level of Service     Encounter Date: 2/28/2022    ED Physician Progress Notes        Physician Note:   Patient observe for adjustment of 3 hours.  Patient reports she feels well.  Lip swelling has significantly decreased.  On my examination there is no uvula swelling, tongue swelling, or any sign of airway involvement.  Patient denies any symptoms at this time, requesting to be discharged.  Patient will follow-up with PCP an organized allergy testing.  Discharged with EpiPen and steroid and Benadryl.

## 2025-05-13 RX ORDER — PANTOPRAZOLE SODIUM 40 MG/1
TABLET, DELAYED RELEASE ORAL
Qty: 60 TABLET | Refills: 2 | Status: SHIPPED | OUTPATIENT
Start: 2025-05-13

## 2025-05-20 ENCOUNTER — ANCILLARY PROCEDURE (OUTPATIENT)
Dept: RADIOLOGY | Facility: EXTERNAL LOCATION | Age: 35
End: 2025-05-20
Payer: MEDICARE

## 2025-05-20 DIAGNOSIS — M54.16 RADICULOPATHY, LUMBAR REGION: ICD-10-CM

## 2025-05-20 PROCEDURE — 62323 NJX INTERLAMINAR LMBR/SAC: CPT | Performed by: ANESTHESIOLOGY

## 2025-05-20 NOTE — PROGRESS NOTES
OPERATIVE NOTE  Preprocedure diagnosis: Lumbar radic  Postprocedure diagnosis Lumbar radic    Procedure performed: L5-S1 ILESI  Physician: Chhaya Young MD  Anesthesia: Local  Complications: none  Blood loss:  Nil    Clinical note: This is a very pleasant 34 y.o. adult with PMH HTN, morbid obesity, former smoker, marijuana use, GERD who suffers with LBP here meeting all medical criteria for above-mentioned procedure. Patient's pain stable and persistent from last visit.   Denies allergies to Latex, steroids, local anesthetics, or iodine/contrast. Denies being on blood thinners. Not diabetic.  Denies fever, chills, NS, CP, SOB, cough, N/V.    Discussed procedure risks/benefits in detail with patient. Pt meets medical necessity for procedure due to failure of conservative measures. Reviewed procedural risks including bleeding, infection, nerve damage, paralysis. Also reviewed mitigating factors such as screening for infection/blood thinner use, sterile precautions, and image-guidance when applicable. All questions answered. Pt/guardian expressed understanding and choose to proceed      Procedure:    Procedure  Lumbar interlaminar steroid injection   Level: L5-S1     Surgical indications  The patient is here today to receive an epidural steroid injection to assist with pain.  Anesthesia: Local    Operative report  The patient was taken to the procedure room, was placed in prone positioning. The lumbar area was prepped and draped sterilely in the normal fashion. Under fluoroscopic guidance, the L5-S1 epidural space was identified. The skin and subcutaneous tissue was anesthetized with 1% lidocaine. An 18-gauge Tuohy needle was introduced coaxially under fluoroscopic guidance. Firm loss-of-resistance to air appreciated at 10 cm. After neg aspiration, contrast easily injected revealing adequate epidural spread without vascular uptake in AP and lateral views. After negative aspiration, 80 mg triamcinolone + 3 ml NS  injected easily. Total volume: 5 ml.    Pt monitored with NIBP, pulse ox throughout procedure. See nursing record for VS (and sedation record if applicable).  Sedation: N/A    The patient tolerated the procedure well, was taken to the recovery room, allowed to recover sufficient amount of time and then was discharged home in stable condition. The patient was advised to followup in 2 weeks      Chhaya Young MD  Interventional Pain Physician  [facility]  8:17 AM  05/20/25

## 2025-06-01 DIAGNOSIS — M54.16 LUMBAR RADICULOPATHY: ICD-10-CM

## 2025-06-02 RX ORDER — PREGABALIN 50 MG/1
50 CAPSULE ORAL 2 TIMES DAILY
Qty: 60 CAPSULE | Refills: 0 | Status: SHIPPED | OUTPATIENT
Start: 2025-06-02

## 2025-06-11 ENCOUNTER — HOSPITAL ENCOUNTER (OUTPATIENT)
Dept: RADIOLOGY | Facility: HOSPITAL | Age: 35
Discharge: HOME | End: 2025-06-11
Payer: MEDICARE

## 2025-06-11 DIAGNOSIS — N20.0 CALCULUS OF KIDNEY: ICD-10-CM

## 2025-06-11 PROCEDURE — 74018 RADEX ABDOMEN 1 VIEW: CPT | Performed by: RADIOLOGY

## 2025-06-11 PROCEDURE — 74018 RADEX ABDOMEN 1 VIEW: CPT

## 2025-06-16 ENCOUNTER — OFFICE VISIT (OUTPATIENT)
Dept: PAIN MEDICINE | Facility: CLINIC | Age: 35
End: 2025-06-16
Payer: MEDICARE

## 2025-06-16 VITALS — SYSTOLIC BLOOD PRESSURE: 110 MMHG | OXYGEN SATURATION: 95 % | DIASTOLIC BLOOD PRESSURE: 70 MMHG | HEART RATE: 120 BPM

## 2025-06-16 DIAGNOSIS — M54.42 CHRONIC BILATERAL LOW BACK PAIN WITH BILATERAL SCIATICA: ICD-10-CM

## 2025-06-16 DIAGNOSIS — M54.16 LUMBAR RADICULOPATHY: Primary | ICD-10-CM

## 2025-06-16 DIAGNOSIS — M54.41 CHRONIC BILATERAL LOW BACK PAIN WITH BILATERAL SCIATICA: ICD-10-CM

## 2025-06-16 DIAGNOSIS — G89.29 CHRONIC BILATERAL LOW BACK PAIN WITH BILATERAL SCIATICA: ICD-10-CM

## 2025-06-16 PROCEDURE — 99214 OFFICE O/P EST MOD 30 MIN: CPT | Performed by: ANESTHESIOLOGY

## 2025-06-16 PROCEDURE — 3074F SYST BP LT 130 MM HG: CPT | Performed by: ANESTHESIOLOGY

## 2025-06-16 PROCEDURE — 3078F DIAST BP <80 MM HG: CPT | Performed by: ANESTHESIOLOGY

## 2025-06-16 PROCEDURE — G2211 COMPLEX E/M VISIT ADD ON: HCPCS | Performed by: ANESTHESIOLOGY

## 2025-06-16 RX ORDER — PREGABALIN 75 MG/1
75 CAPSULE ORAL 2 TIMES DAILY
Qty: 60 CAPSULE | Refills: 0 | Status: SHIPPED | OUTPATIENT
Start: 2025-06-16 | End: 2025-07-16

## 2025-06-16 ASSESSMENT — ENCOUNTER SYMPTOMS
EYES NEGATIVE: 1
RESPIRATORY NEGATIVE: 1
WEAKNESS: 0
PSYCHIATRIC NEGATIVE: 1
HEMATOLOGIC/LYMPHATIC NEGATIVE: 1
NUMBNESS: 1
ENDOCRINE NEGATIVE: 1
GASTROINTESTINAL NEGATIVE: 1
CONSTITUTIONAL NEGATIVE: 1
CARDIOVASCULAR NEGATIVE: 1
BACK PAIN: 1
LOSS OF SENSATION IN FEET: 0
OCCASIONAL FEELINGS OF UNSTEADINESS: 0
DEPRESSION: 0

## 2025-06-16 ASSESSMENT — PAIN SCALES - GENERAL
PAINLEVEL_OUTOF10: 8
PAINLEVEL_OUTOF10: 8

## 2025-06-16 ASSESSMENT — PAIN DESCRIPTION - DESCRIPTORS: DESCRIPTORS: ACHING;SORE

## 2025-06-16 ASSESSMENT — PAIN - FUNCTIONAL ASSESSMENT: PAIN_FUNCTIONAL_ASSESSMENT: 0-10

## 2025-06-16 NOTE — PROGRESS NOTES
PAIN MANAGEMENT FOLLOW-UP OFFICE NOTE    Date of Service: 6/16/2025    SUBJECTIVE    CHIEF COMPLAINT: LBP    HISTORY OF PRESENT ILLNESS    Bree Lamar is a 34 y.o. adult pt of Dr Osborne with PMH HTN, morbid obesity, former smoker, marijuana use, GERD, kidney stones who presents for F/U    On 5/20, pt underwent L5-S1 ILESI with 80% relief for 3 w. Since that time, LBP radiating to BL groin/BLE c/w baseline. Of note, seeing urologist and planning L shockwave lithotripsy for kidney stones    Pt denies new-onset weakness, bowel/bladder incontinence.  Pt denies recent infection, allergy to Latex/iodine/contrast. Patient is currently taking the following blood thinner(s): N/A    Procedure log:  -L5-S1 ILESI 5/20/25: 80% relief 3 w    REVIEW OF SYSTEMS  Review of Systems   Constitutional: Negative.    HENT: Negative.     Eyes: Negative.    Respiratory: Negative.     Cardiovascular: Negative.    Gastrointestinal: Negative.    Endocrine: Negative.    Musculoskeletal:  Positive for back pain.   Skin: Negative.    Neurological:  Positive for numbness. Negative for weakness.   Hematological: Negative.    Psychiatric/Behavioral: Negative.         PAST MEDICAL HISTORY  Medical History[1]  Surgical History[2]  Family History[3]    CURRENT MEDICATIONS  Current Medications[4]    ALLERGIES AND DRUG REACTIONS  Allergies[5]       OBJECTIVE  Visit Vitals  /70   Pulse (!) 120   SpO2 95%   OB Status OCP   Smoking Status Former       Last Recorded Pain Score (if available):           Pain Score:   8     Physical Exam  Vitals and nursing note reviewed.       General: Sitting in chair, NAD, ambulating with cane  Head: NCAT  Eyes: Sclera/conjunctiva clear, EOMI, PERRL  Nose/mouth: MMM  CV: Good distal pulses  Lungs: Good/equal chest excursion  Abdomen: Soft, ND  Ext: No cyanosis/edema  MSK: L-spine alignment: unremarkable, BL paraspinal m TTP, Lspine ROM: flexion/extension assoc with pain    Neuro: AAOx3, CN grossly nl  Dermatome  sensation to light touch  LEFT L1 (lower pelvis/upper thigh): WNL    RIGHT L1: WNL      LEFT L2 (upper thigh): WNL       RIGHT: L2:WNL      LEFT L3 (medial knee): WNL       RIGHT L3: WNL      LEFT L4 (superior medial malleolus): WNL       RIGHT L4: WNL      LEFT L5 (dorsal foot): WNL       RIGHT L5: WNL      LEFT S1 (lateral foot): WNL     RIGHT S1: WNL      LEFT S2 (popliteal fossa): WNL    RIGHT S2: WNL        Motor strength  LEFT L2 (hip flexion): 5/5   RIGHT L2: 5/5  LEFT L3 (knee extension): 5/5     RIGHT L3: 5/5  LEFT L4 (dorsiflexion): 5/5     RIGHT L4: 5/5  LEFT L5 (EHL extension): 5/5     RIGHT L5: 5/5  LEFT S1 (plantarflexion): 5/5     RIGHT S1: 5/5  LEFT S2 (knee flexion): 5/5      RIGHT S2: 5/5      Psych: affect nl  Skin: no rash/lesions      REVIEW OF LABORATORY DATA  I have reviewed the following lab results:  WBC   Date Value Ref Range Status   12/21/2024 9.7 4.4 - 11.3 x10*3/uL Final     RBC   Date Value Ref Range Status   12/21/2024 4.66 4.00 - 5.90 x10*6/uL Final     Hemoglobin   Date Value Ref Range Status   12/21/2024 12.7 12.0 - 17.5 g/dL Final     Hematocrit   Date Value Ref Range Status   12/21/2024 39.9 36.0 - 52.0 % Final     MCV   Date Value Ref Range Status   12/21/2024 86 80 - 100 fL Final     MCH   Date Value Ref Range Status   12/21/2024 27.3 26.0 - 34.0 pg Final     MCHC   Date Value Ref Range Status   12/21/2024 31.8 (L) 32.0 - 36.0 g/dL Final     RDW   Date Value Ref Range Status   12/21/2024 13.9 11.5 - 14.5 % Final     Platelets   Date Value Ref Range Status   12/21/2024 437 150 - 450 x10*3/uL Final     Sodium   Date Value Ref Range Status   12/19/2024 135 (L) 136 - 145 mmol/L Final     Potassium   Date Value Ref Range Status   12/19/2024 4.1 3.5 - 5.3 mmol/L Final     Bicarbonate   Date Value Ref Range Status   12/19/2024 19 (L) 21 - 32 mmol/L Final     Urea Nitrogen   Date Value Ref Range Status   12/19/2024 11 6 - 23 mg/dL Final     Calcium   Date Value Ref Range Status  "  12/19/2024 9.3 8.6 - 10.3 mg/dL Final     No results found for: \"PROTIME\", \"PTT\", \"INR\", \"FIBRINOGEN\"      REVIEW OF RADIOLOGY   I have reviewed the following:  Radiology Studies           MRI L-spine 12/6/23 at [facility]:  Counting reference:  Lumbosacral junction.  For the purposes of this   report,  the mid L4 vertebral body is considered the level of the iliac   crest and assume there are 5 lumbar-type vertebrae.  Anatomic variant:    None.     Localizer images:  No additional findings.     Alignment:    Low-grade retrolisthesis of L4 on L5.     Bone marrow signal/fracture:  No evidence of pathologic marrow   infiltration.  No evidence of prior fracture.  Mild degenerative disc   disease with disc desiccation and mild height loss at L3-4.     Conus: The conus terminates at L1-L2 and is within normal limits of   morphology and signal.  Normal course and caliber of the descending cauda   equina nerve roots.     Paraspinal soft tissues:   Paraspinal soft tissues are within normal   limits.     Lower thoracic spine:  Visualized lower thoracic canal and foramina are   patent.     L1-L2:   Canal and foramina are patent.     L2-L3:    Canal and foramina are patent     L3-L4:    Canal and foramina are patent     L4-L5:    Canal and foramina are patent.  Mild bilateral facet   arthropathy.     L5-S1:   Canal and foramina are patent.  Moderate bilateral facet   arthropathy.     Sacrum and iliac wings:   The visualized sacrum and iliac wings are   within normal limits.     IMPRESSION:     Minimal degenerative spondylosis.  No high-grade lumbar spinal canal or   neuroforaminal stenosis.     Anatomic Lumbar Variant: None.  The mid L4 vertebral body is considered   the level of the iliac crest and assume there are 5 lumbar-type vertebrae.          ASSESSMENT & PLAN  Bree Lamar is a 34 y.o. adult pt of Dr Osborne with PMH HTN, morbid obesity, former smoker, marijuana use, GERD, kidney stones who presents for F/U    1) " LBP  -Since 2020 with radiation to BL hips assoc with BLE numbness w/o obj deficit  -Refractive to yrs of conservative tx including Tylenol, ibuprofen, cyclobenzaprine, duloxetine, topiramate, >4 mo PT  -Reviewed/discussed MRI L-spine 12/6/23 at [facility]: multilevel spondylosis featuring L5-S1 disc complex contributing to stenosis  -Saw Dr Osborne 4/16/25 who rec'd conservative mgmt.   -Reminded to F/U rheum  -L5-S1 ILESI 5/20/25: 80% relief 3 w  -Schedule BL L5-S1 TFESI to target pain generator as seen on imaging and minimize risk/likelihood of chronic opioid use and/or surgery  -In meantime, inc Lyrica to 75 mg BID    2) Morbid obesity  Discussed pt's obesity: BMI 53.1. Discussed its potential affect on worsening chronic pain through mechanical stress as well as neuro-inflammatory chemicals. This is in addition to contribution to metabolic syndrome and overal health and perioperative risk. Counseled on wt loss strategy. Rec'd pt discuss Zepbound with PCP            Discussed procedure risks/benefits in detail with patient. Pt meets medical necessity for procedure due to failure of conservative measures. Reviewed procedural risks including bleeding, infection, nerve damage, paralysis. Also reviewed mitigating factors such as screening for infection/blood thinner use, sterile precautions, and image-guidance when applicable. All questions answered. Pt/guardian expressed understanding and choose to proceed    Today's visit involved continuation of chronic pain care. In the context of the complexity of this patient's chronic pain diagnosis, long-term expectations and care planning discussed. Adequate time taken to ensure patient understanding and answer questions. Imaging studies ordered are placed do elucidate the patient's diagnosis, but also to evaluate the patient's candidacy for procedural and surgical interventions. The risks and benefits of these potential interventions are detailed as above.           Chhaya  MD Hannah  Anesthesiologist & Interventional Pain Physician   [facility] [place]  O: [phone]  F: [phone]  2:15 PM  06/16/25         [1]   Past Medical History:  Diagnosis Date    Abscess of nose 01/30/2024    Abscess, furuncle and carbuncle of nose 07/12/2018    Cellulitis of nose, external    Acute left ankle pain 01/30/2024    Allergic conjunctivitis of both eyes 01/30/2024    Anxiety disorder, unspecified 03/03/2021    Anxiety    Conjunctivitis 01/30/2024    DDD (degenerative disc disease), lumbar     Dental infection 01/30/2024    Diarrhea 01/30/2024    Fever, intermittent 01/30/2024    Hyperlipidemia     Hypertension     Otalgia 01/30/2024    Other general symptoms and signs 01/10/2017    Flu-like symptoms    Personal history of other diseases of the digestive system 03/03/2021    History of irritable bowel syndrome    Personal history of other diseases of the respiratory system 02/15/2016    History of acute bronchitis    Personal history of other mental and behavioral disorders 02/23/2022    History of depression    Personal history of other mental and behavioral disorders     History of autism    Pharyngitis with viral syndrome 01/30/2024    Sore throat 01/30/2024    Strain of left ankle 01/30/2024   [2]   Past Surgical History:  Procedure Laterality Date    LITHOTRIPSY  2024    MOUTH SURGERY  01/14/2016    Oral Surgery Tooth Extraction   [3]   Family History  Problem Relation Name Age of Onset    Asthma Sister      Crohn's disease Brother     [4]   Current Outpatient Medications   Medication Sig Dispense Refill    albuterol (ProAir HFA) 90 mcg/actuation inhaler Inhale 2 puffs every 6 hours if needed for shortness of breath. 18 g 2    carvedilol (Coreg) 12.5 mg tablet Take 1 tablet by mouth twice daily 60 tablet 11    cetirizine (ZyrTEC) 10 mg tablet Take 1 tablet (10 mg) by mouth 2 times a day.      cholecalciferol (Vitamin D-3) 5,000 Units tablet Take 1 tablet (125 mcg) by mouth once  daily.      cyclobenzaprine (Flexeril) 10 mg tablet Take 1 tablet (10 mg) by mouth 3 times a day as needed for muscle spasms. 90 tablet 0    DULoxetine (Cymbalta) 30 mg DR capsule Take 1 capsule (30 mg) by mouth once daily.      DULoxetine (Cymbalta) 60 mg DR capsule Take 1 capsule (60 mg) by mouth once daily.      hydrOXYzine pamoate (Vistaril) 50 mg capsule Take 1 capsule (50 mg) by mouth 3 times a day as needed for anxiety.      LORazepam (Ativan) 0.5 mg tablet Take 1 tablet (0.5 mg) by mouth once daily as needed for anxiety.      Loryna, 28, 3-0.02 mg tablet Take by mouth once daily.      ondansetron (Zofran) 4 mg tablet TAKE 1 TABLET BY MOUTH EVERY 8 HOURS AS NEEDED FOR NAUSEA OR VOMITING 20 tablet 0    pantoprazole (ProtoNix) 40 mg EC tablet TAKE 1 TABLET BY MOUTH TWICE DAILY. SWALLOW WHOLE, DO NOT CRUSH, SPLIT OR CHEW. 60 tablet 2    pregabalin (Lyrica) 50 mg capsule Take 1 capsule (50 mg) by mouth 2 times a day. 60 capsule 0    rosuvastatin (Crestor) 10 mg tablet Take 1 tablet (10 mg) by mouth once daily at bedtime. 100 tablet 3    SUMAtriptan (Imitrex) 100 mg tablet Take 1 tablet (100 mg) by mouth 1 time if needed for migraine. PRN 9 tablet 0    topiramate (Topamax) 50 mg tablet Take 1 tablet (50 mg) by mouth 2 times a day. 200 tablet 1    traZODone (Desyrel) 100 mg tablet Take 1 tablet (100 mg) by mouth once daily at bedtime.      triamcinolone (Kenalog) 0.1 % ointment        No current facility-administered medications for this visit.   [5]   Allergies  Allergen Reactions    Shellfish Derived Hives    Adhesive Tape-Silicones Itching and Rash

## 2025-06-24 ENCOUNTER — ANCILLARY PROCEDURE (OUTPATIENT)
Dept: RADIOLOGY | Facility: EXTERNAL LOCATION | Age: 35
End: 2025-06-24
Payer: MEDICARE

## 2025-06-24 DIAGNOSIS — M54.16 RADICULOPATHY, LUMBAR REGION: ICD-10-CM

## 2025-06-24 PROCEDURE — 64483 NJX AA&/STRD TFRM EPI L/S 1: CPT | Performed by: ANESTHESIOLOGY

## 2025-06-24 NOTE — PROGRESS NOTES
OPERATIVE NOTE  Preprocedure diagnosis: Lumbar radic  Postprocedure diagnosis Lumbar radic    Procedure performed: BL L5-S1 TFESI   Physician: Chhaya Young MD  Anesthesia: Local  Complications: none  Blood loss:  Nil    Clinical note: This is a very pleasant 34 y.o. adult pt of Dr Osborne with PMH HTN, morbid obesity, former smoker, marijuana use, GERD, kidney stones who suffers with LBP here meeting all medical criteria for above-mentioned procedure. Patient's pain stable and persistent from last visit.  Denies allergies to Latex, steroids, local anesthetics, or iodine/contrast. Denies being on blood thinners. Not diabetic.  Denies fever, chills, NS, CP, SOB, cough, N/V.    Discussed procedure risks/benefits in detail with patient. Pt meets medical necessity for procedure due to failure of conservative measures. Reviewed procedural risks including bleeding, infection, nerve damage, paralysis. Also reviewed mitigating factors such as screening for infection/blood thinner use, sterile precautions, and image-guidance when applicable. All questions answered. Pt/guardian expressed understanding and choose to proceed      Procedure:  PROCEDURE  Bilateral L5-S1 transforaminal epidural steroid injection    LOCATION: [facility]  Anesthesia: Local    Informed consent completed.  Patient brought to procedure suite and positioned on table prone.  Lower back prepped with chlorhexidine and draped in sterile fashion.  Fluoroscopy utilized to identify L5-S1 level.  C-arm was obliqued to left side to visualize L5-S1 foramen.  1.5 inch 25-gauge needle used to administer 1% lidocaine for local anesthesia.  Then, 5 inch 22-gauge needle were advanced coaxially to superolateral aspect of foramina via x-ray guidance.  AP view utilized to visualize needles approaching junction of the transverse process and vertebral body.  Lateral view taken to visualize needles just anterior to posterior spinal column.  Needle was then advanced  slightly and after negative add aspiration, 1 cc Omnipaque 240 administered at each level revealing adequate epidural spread without vascular uptake.  AP view taken to confirm epidural spread medially without vascular uptake.     After negative aspiration, 7.5 mg dexamethasone +1.25  cc preservative-free saline injected.      Procedure was then repeated in similar fashion on the right side without issue.       Total Injectate: 15 mg dexamethasone + 2.5 ml PF NS    Needles re-styleted and removed.  Hemostasis achieved.  Band-Aids placed.      Sedation: N/A    Patient tolerated procedure well and without issue.  They were discharged home in stable condition.      Chhaya Young MD  Anesthesiologist & Interventional Pain Physician   [facility] [place]  O: [phone]  F: [phone]  8:17 AM  06/24/25

## 2025-06-26 ENCOUNTER — PRE-ADMISSION TESTING (OUTPATIENT)
Dept: PREADMISSION TESTING | Facility: HOSPITAL | Age: 35
End: 2025-06-26
Payer: MEDICARE

## 2025-06-26 VITALS
BODY MASS INDEX: 48.82 KG/M2 | DIASTOLIC BLOOD PRESSURE: 84 MMHG | OXYGEN SATURATION: 100 % | WEIGHT: 293 LBS | HEART RATE: 96 BPM | HEIGHT: 65 IN | SYSTOLIC BLOOD PRESSURE: 140 MMHG | TEMPERATURE: 97.2 F | RESPIRATION RATE: 16 BRPM

## 2025-06-26 DIAGNOSIS — Z01.818 PREOP TESTING: Primary | ICD-10-CM

## 2025-06-26 LAB
ANION GAP SERPL CALCULATED.3IONS-SCNC: 12 MMOL/L (ref 10–20)
BASOPHILS # BLD AUTO: 0.14 X10*3/UL (ref 0–0.1)
BASOPHILS NFR BLD AUTO: 0.9 %
BUN SERPL-MCNC: 19 MG/DL (ref 6–23)
CALCIUM SERPL-MCNC: 8.9 MG/DL (ref 8.6–10.3)
CHLORIDE SERPL-SCNC: 108 MMOL/L (ref 98–107)
CO2 SERPL-SCNC: 22 MMOL/L (ref 21–32)
CREAT SERPL-MCNC: 1.09 MG/DL (ref 0.5–1.3)
EGFRCR SERPLBLD CKD-EPI 2021: 68 ML/MIN/1.73M*2
EOSINOPHIL # BLD AUTO: 0.21 X10*3/UL (ref 0–0.7)
EOSINOPHIL NFR BLD AUTO: 1.4 %
ERYTHROCYTE [DISTWIDTH] IN BLOOD BY AUTOMATED COUNT: 14.9 % (ref 11.5–14.5)
GLUCOSE SERPL-MCNC: 94 MG/DL (ref 74–99)
HCT VFR BLD AUTO: 38.2 % (ref 36–52)
HGB BLD-MCNC: 11.9 G/DL (ref 12–17.5)
IMM GRANULOCYTES # BLD AUTO: 0.12 X10*3/UL (ref 0–0.7)
IMM GRANULOCYTES NFR BLD AUTO: 0.8 % (ref 0–0.9)
LYMPHOCYTES # BLD AUTO: 4.23 X10*3/UL (ref 1.2–4.8)
LYMPHOCYTES NFR BLD AUTO: 27.3 %
MCH RBC QN AUTO: 27.5 PG (ref 26–34)
MCHC RBC AUTO-ENTMCNC: 31.2 G/DL (ref 32–36)
MCV RBC AUTO: 88 FL (ref 80–100)
MONOCYTES # BLD AUTO: 1.05 X10*3/UL (ref 0.1–1)
MONOCYTES NFR BLD AUTO: 6.8 %
NEUTROPHILS # BLD AUTO: 9.74 X10*3/UL (ref 1.2–7.7)
NEUTROPHILS NFR BLD AUTO: 62.8 %
NRBC BLD-RTO: 0 /100 WBCS (ref 0–0)
PLATELET # BLD AUTO: 463 X10*3/UL (ref 150–450)
POTASSIUM SERPL-SCNC: 4.1 MMOL/L (ref 3.5–5.3)
RBC # BLD AUTO: 4.33 X10*6/UL (ref 4–5.9)
SODIUM SERPL-SCNC: 138 MMOL/L (ref 136–145)
WBC # BLD AUTO: 15.5 X10*3/UL (ref 4.4–11.3)

## 2025-06-26 PROCEDURE — 36415 COLL VENOUS BLD VENIPUNCTURE: CPT

## 2025-06-26 PROCEDURE — 99214 OFFICE O/P EST MOD 30 MIN: CPT | Performed by: NURSE PRACTITIONER

## 2025-06-26 PROCEDURE — 80048 BASIC METABOLIC PNL TOTAL CA: CPT

## 2025-06-26 PROCEDURE — 85025 COMPLETE CBC W/AUTO DIFF WBC: CPT

## 2025-06-26 RX ORDER — BISMUTH SUBSALICYLATE 262 MG
1 TABLET,CHEWABLE ORAL DAILY
COMMUNITY

## 2025-06-26 ASSESSMENT — DUKE ACTIVITY SCORE INDEX (DASI)
DASI METS SCORE: 4.7
TOTAL_SCORE: 15.95
CAN YOU DO LIGHT WORK AROUND THE HOUSE LIKE DUSTING OR WASHING DISHES: YES
CAN YOU HAVE SEXUAL RELATIONS: YES
CAN YOU PARTICIPATE IN STRENOUS SPORTS LIKE SWIMMING, SINGLES TENNIS, FOOTBALL, BASKETBALL, OR SKIING: NO
CAN YOU WALK INDOORS, SUCH AS AROUND YOUR HOUSE: YES
CAN YOU PARTICIPATE IN MODERATE RECREATIONAL ACTIVITIES LIKE GOLF, BOWLING, DANCING, DOUBLES TENNIS OR THROWING A BASEBALL OR FOOTBALL: NO
CAN YOU DO YARD WORK LIKE RAKING LEAVES, WEEDING OR PUSHING A MOWER: NO
CAN YOU DO HEAVY WORK AROUND THE HOUSE LIKE SCRUBBING FLOORS OR LIFTING AND MOVING HEAVY FURNITURE: NO
CAN YOU CLIMB A FLIGHT OF STAIRS OR WALK UP A HILL: NO
CAN YOU WALK A BLOCK OR TWO ON LEVEL GROUND: NO
CAN YOU RUN A SHORT DISTANCE: NO
CAN YOU DO MODERATE WORK AROUND THE HOUSE LIKE VACUUMING, SWEEPING FLOORS OR CARRYING GROCERIES: YES
CAN YOU TAKE CARE OF YOURSELF (EAT, DRESS, BATHE, OR USE TOILET): YES

## 2025-06-26 ASSESSMENT — ENCOUNTER SYMPTOMS
PSYCHIATRIC NEGATIVE: 1
HEMATOLOGIC/LYMPHATIC NEGATIVE: 1
CARDIOVASCULAR NEGATIVE: 1
EYES NEGATIVE: 1
CONSTITUTIONAL NEGATIVE: 1
GASTROINTESTINAL NEGATIVE: 1
NEUROLOGICAL NEGATIVE: 1
ENDOCRINE NEGATIVE: 1
RESPIRATORY NEGATIVE: 1
BACK PAIN: 1

## 2025-06-26 NOTE — CPM/PAT H&P
"CPM/PAT Evaluation       Name: Bree Lamar (Bree Lamar \"Mal\")  /Age: 1990/34 y.o.     In-Person       Chief Complaint: left kidney stone     HPI    Pt is a 34 year old female with a left kidney stone. Pt has history of kidney stones. She had a cystoscopy, left ureteroscopy, and laser lithotripsy 2025. Pt stated she completed XR imaging for her pain management physician this past April and the left kidney stone was found. Pt recently started experiencing mild left side and left lower abdominal pain. Pt denies fevers, gross hematuria, dysuria, and urinary frequency. Pt was examined by her urologist and has been scheduled for left ESWL. Pt denies CP, SOB, or dizziness.     Past Medical History:   Diagnosis Date    Abscess of nose 2024    Abscess, furuncle and carbuncle of nose 2018    Cellulitis of nose, external    Acute left ankle pain 2024    Allergic conjunctivitis of both eyes 2024    Anxiety     Anxiety disorder, unspecified 2021    Anxiety    Autism ([facility])     Chronic nausea     Conjunctivitis 2024    COVID     DDD (degenerative disc disease), lumbar     Dental infection 2024    Depression     Diarrhea 2024    Fibromyalgia     GERD (gastroesophageal reflux disease)     Hyperlipidemia     Hypertension     Lower back pain     Migraines     Otalgia 2024    Personal history of other diseases of the digestive system 2021    History of irritable bowel syndrome    Personal history of other mental and behavioral disorders 2022    History of depression    Personal history of other mental and behavioral disorders     History of autism    Strain of left ankle 2024       Past Surgical History:   Procedure Laterality Date    KIDNEY STONE SURGERY      LITHOTRIPSY      MOUTH SURGERY  2016    [place] teeth extraction    OTHER SURGICAL HISTORY  2025    KS CYSTO W/URETEROSCOPY W/LITHOTRIPSY     Social History "     Tobacco Use    Smoking status: Former     Current packs/day: 0.00     Average packs/day: 1 pack/day for 16.0 years (16.0 ttl pk-yrs)     Types: Cigarettes     Start date: 2007     Quit date: 2023     Years since quittin.3    Smokeless tobacco: Never   Substance Use Topics    Alcohol use: Yes     Comment: RARE     Social History     Substance and Sexual Activity   Drug Use Yes    Types: Marijuana    Comment: THC GUMMIES RECREATIONAL -       Patient  has no history on file for sexual activity.    Family History[1]    Allergies   Allergen Reactions    Shellfish Derived Hives    Adhesive Tape-Silicones Itching and Rash     Current Outpatient Medications   Medication Sig Dispense Refill    albuterol (ProAir HFA) 90 mcg/actuation inhaler Inhale 2 puffs every 6 hours if needed for shortness of breath. 18 g 2    carvedilol (Coreg) 12.5 mg tablet Take 1 tablet by mouth twice daily 60 tablet 11    cetirizine (ZyrTEC) 10 mg tablet Take 1 tablet (10 mg) by mouth 2 times a day.      cholecalciferol (Vitamin D-3) 5,000 Units tablet Take 1 tablet (125 mcg) by mouth once daily.      cyclobenzaprine (Flexeril) 10 mg tablet Take 1 tablet (10 mg) by mouth 3 times a day as needed for muscle spasms. 90 tablet 0    DULoxetine (Cymbalta) 30 mg DR capsule Take 1 capsule (30 mg) by mouth once daily.      DULoxetine (Cymbalta) 60 mg DR capsule Take 1 capsule (60 mg) by mouth once daily.      hydrOXYzine pamoate (Vistaril) 50 mg capsule Take 1 capsule (50 mg) by mouth 3 times a day as needed for anxiety.      LORazepam (Ativan) 0.5 mg tablet Take 1 tablet (0.5 mg) by mouth once daily as needed for anxiety.      Loryna, 28, 3-0.02 mg tablet Take by mouth once daily.      multivitamin tablet Take 1 tablet by mouth once daily.      ondansetron (Zofran) 4 mg tablet TAKE 1 TABLET BY MOUTH EVERY 8 HOURS AS NEEDED FOR NAUSEA OR VOMITING 20 tablet 0    pantoprazole (ProtoNix) 40 mg EC tablet TAKE 1 TABLET BY MOUTH TWICE DAILY.  SWALLOW WHOLE, DO NOT CRUSH, SPLIT OR CHEW. 60 tablet 2    pregabalin (Lyrica) 75 mg capsule Take 1 capsule (75 mg) by mouth 2 times a day. 60 capsule 0    rosuvastatin (Crestor) 10 mg tablet Take 1 tablet (10 mg) by mouth once daily at bedtime. 100 tablet 3    SUMAtriptan (Imitrex) 100 mg tablet Take 1 tablet (100 mg) by mouth 1 time if needed for migraine. PRN 9 tablet 0    topiramate (Topamax) 50 mg tablet Take 1 tablet (50 mg) by mouth 2 times a day. 200 tablet 1    traZODone (Desyrel) 100 mg tablet Take 1 tablet (100 mg) by mouth once daily at bedtime.      triamcinolone (Kenalog) 0.1 % ointment        No current facility-administered medications for this visit.     Review of Systems   Constitutional: Negative.    HENT: Negative.     Eyes: Negative.    Respiratory: Negative.     Cardiovascular: Negative.    Gastrointestinal: Negative.    Endocrine: Negative.    Genitourinary:         History of kidney stones   Musculoskeletal:  Positive for back pain (chronic lower back pain).   Skin: Negative.    Neurological: Negative.    Hematological: Negative.    Psychiatric/Behavioral: Negative.       Physical Exam  Vitals reviewed.   Constitutional:       Appearance: Mal is morbidly obese.   HENT:      Head: Normocephalic and atraumatic.      Nose: Nose normal.      Mouth/Throat:      Mouth: Mucous membranes are moist.      Pharynx: Oropharynx is clear.   Eyes:      Extraocular Movements: Extraocular movements intact.      Conjunctiva/sclera: Conjunctivae normal.      Pupils: Pupils are equal, round, and reactive to light.   Cardiovascular:      Rate and Rhythm: Normal rate and regular rhythm.      Pulses: Normal pulses.      Heart sounds: Normal heart sounds.   Pulmonary:      Effort: Pulmonary effort is normal. No respiratory distress.      Breath sounds: Normal breath sounds. No wheezing, rhonchi or rales.   Abdominal:      General: Bowel sounds are normal.      Palpations: Abdomen is soft.      Tenderness: There is  "no abdominal tenderness. There is no right CVA tenderness, left CVA tenderness, guarding or rebound.   Musculoskeletal:      Cervical back: Normal range of motion and neck supple.      Comments: Ambulates with a cane   Skin:     General: Skin is warm and dry.   Neurological:      General: No focal deficit present.      Mental Status: Mal is alert. Mental status is at baseline.   Psychiatric:         Mood and Affect: Mood normal.         Behavior: Behavior normal.         Thought Content: Thought content normal.         Judgment: Judgment normal.          PAT AIRWAY:   Airway:     Mallampati::  III    TM distance::  >3 FB    Neck ROM::  Full  normal        Visit Vitals  /84   Pulse 96   Temp 36.2 °C (97.2 °F) (Axillary)   Resp 16   Ht 1.651 m (5' 5\")   Wt (!) 151 kg (332 lb)   SpO2 100%   BMI 55.25 kg/m²   OB Status OCP   Smoking Status Former   BSA 2.63 m²     ASA: 3   CHADS: 2.8%  RCRI: 0.4%  Ariscat: 1.6%  DASI Risk Score      Flowsheet Row Pre-Admission Testing from 6/26/2025 in [facility] Pre-Admission Testing from 2/25/2025 in [facility]   Can you take care of yourself (eat, dress, bathe, or use toilet)?  2.75 filed at 06/26/2025 1509 2.75 filed at 02/25/2025 0753   Can you walk indoors, such as around your house? 1.75 filed at 06/26/2025 1509 1.75 filed at 02/25/2025 0753   Can you walk a block or two on level ground?  0  [WALKS WITH CANE PRN, WINDED WITH ACTIVITY] filed at 06/26/2025 1509 2.75 filed at 02/25/2025 0753   Can you climb a flight of stairs or walk up a hill? 0 filed at 06/26/2025 1509 5.5 filed at 02/25/2025 0753   Can you run a short distance? 0 filed at 06/26/2025 1509 0 filed at 02/25/2025 0753   Can you do light work around the house like dusting or washing dishes? 2.7 filed at 06/26/2025 1509 2.7 filed at 02/25/2025 0753   Can you do moderate work around the house like vacuuming, sweeping floors or carrying groceries? 3.5 filed at 06/26/2025 1509 3.5 " filed at 02/25/2025 0753   Can you do heavy work around the house like scrubbing floors or lifting and moving heavy furniture?  0 filed at 06/26/2025 1509 0 filed at 02/25/2025 0753   Can you do yard work like raking leaves, weeding or pushing a mower? 0 filed at 06/26/2025 1509 0 filed at 02/25/2025 0753   Can you have sexual relations? 5.25 filed at 06/26/2025 1509 5.25 filed at 02/25/2025 0753   Can you participate in moderate recreational activities like golf, bowling, dancing, doubles tennis or throwing a baseball or football? 0 filed at 06/26/2025 1509 0 filed at 02/25/2025 0753   Can you participate in strenous sports like swimming, singles tennis, football, basketball, or skiing? 0 filed at 06/26/2025 1509 0 filed at 02/25/2025 0753   DASI SCORE 15.95 filed at 06/26/2025 1509 24.2 filed at 02/25/2025 0753   METS Score (Will be calculated only when all the questions are answered) 4.7 filed at 06/26/2025 1509 5.7 filed at 02/25/2025 0753          Caprini DVT Assessment    No data to display       Modified Frailty Index    No data to display       [id number] Stroke Risk Points  Current as of 2 minutes ago        N/A 0 to 9 Points:      Last Change: N/A          The [id number] risk score (Lip GH, et al. 2009. © 2010 [employer]) quantifies the risk of stroke for a patient with atrial fibrillation. For patients without atrial fibrillation or under the age of 18 this score appears as N/A. Higher score values generally indicate higher risk of stroke.        This score is not applicable to this patient. Components are not calculated.          Revised Cardiac Risk Index      Flowsheet Row Pre-Admission Testing from 6/26/2025 in [facility][note 3] Pre-Admission Testing from 2/25/2025 in [facility]   High-Risk Surgery (Intraperitoneal, Intrathoracic,Suprainguinal vascular) 0 filed at 06/26/2025 1528 0 filed at 02/25/2025 0817   History of ischemic heart disease  (History of MI, History of positive exercuse test, Current chest paint considered due to myocardial ischemia, Use of nitrate therapy, ECG with pathological Q Waves) 0 filed at 06/26/2025 1528 0 filed at 02/25/2025 0817   History of congestive heart failure (pulmonary edemia, bilateral rales or S3 gallop, Paroxysmal nocturnal dyspnea, CXR showing pulmonary vascular redistribution) 0 filed at 06/26/2025 1528 0 filed at 02/25/2025 0817   History of cerebrovascular disease (Prior TIA or stroke) 0 filed at 06/26/2025 1528 0 filed at 02/25/2025 0817   Pre-operative insulin treatment 0 filed at 06/26/2025 1528 0 filed at 02/25/2025 0817   Pre-operative creatinine>2 mg/dl 0 filed at 06/26/2025 1528 0 filed at 02/25/2025 0817   Revised Cardiac Risk Calculator 0 filed at 06/26/2025 1528 0 filed at 02/25/2025 0817          Apfel Simplified Score    No data to display       Risk Analysis Index Results This Encounter    No data found in the last 10 encounters.       Stop Bang Score      Flowsheet Row Pre-Admission Testing from 6/26/2025 in [facility] Pre-Admission Testing from 2/25/2025 in [facility]   Do you snore loudly? 0 filed at 06/26/2025 1510 0 filed at 02/25/2025 0753   Do you often feel tired or fatigued after your sleep? 1 filed at 06/26/2025 1510 1 filed at 02/25/2025 0753   Has anyone ever observed you stop breathing in your sleep? 0 filed at 06/26/2025 1510 0 filed at 02/25/2025 0753   Do you have or are you being treated for high blood pressure? 1 filed at 06/26/2025 1510 1 filed at 02/25/2025 0753   Recent BMI (Calculated) 53.1 filed at 06/26/2025 1510 53.1 filed at 02/25/2025 0753   Is BMI greater than 35 kg/m2? 1=Yes filed at 06/26/2025 1510 1=Yes filed at 02/25/2025 0753   Age older than 50 years old? 0=No filed at 06/26/2025 1510 0=No filed at 02/25/2025 0753   Is your neck circumference greater than 17 inches (Male) or 16 inches (Female)? 1 filed at 06/26/2025 1510 1 filed at  02/25/2025 0753   Gender - Male 1=Yes filed at 06/26/2025 1510 1=Yes filed at 02/25/2025 0753   STOP-BANG Total Score 5 filed at 06/26/2025 1510 5 filed at 02/25/2025 0753          Prodigy: High Risk  Total Score: 0          ARISCAT Score for Postoperative Pulmonary Complications    No data to display       Cosme Perioperative Risk for Myocardial Infarction or Cardiac Arrest (ROQUE)    No data to display         Assessment and Plan:     Calculus of kidney: Electracorporeal Shock Wave Lithotripsy left.  HTN: pt is taking carvedilol.   Hyperlipidemia: pt is taking rosuvastatin.   GERD: pt is taking pantoprazole.   BMI: 55.25  Occasional THC gummy use.  IBS  Migraines: Pt can use sumatriptan as needed.   Seasonal allergies:  Anxiety/Depression: pt is taking duloxetine. Pt can take Vistaril and lorazepam as needed.   Chronic nausea: pt takes Zofran as needed.   Fibromyalgia: followed by pain management.   Autism  Chronic lower back pain: pt is followed by pain management. Pt had recent steroid back injection.     CBC and BMP ordered in PAT.  EKG completed on 2/25/2025.    SAMMI Haynes    6/27/2025:  Dr Lara was notified about the patient's elevated WBC count.   SAMMI Haynes         [1]   Family History  Problem Relation Name Age of Onset    Asthma Sister      Crohn's disease Brother

## 2025-06-26 NOTE — H&P (VIEW-ONLY)
"CPM/PAT Evaluation       Name: Bree Lamar (Bree Lamar \"Mal\")  /Age: 1990/34 y.o.     In-Person       Chief Complaint: left kidney stone     HPI    Pt is a 34 year old female with a left kidney stone. Pt has history of kidney stones. She had a cystoscopy, left ureteroscopy, and laser lithotripsy 2025. Pt stated she completed XR imaging for her pain management physician this past April and the left kidney stone was found. Pt recently started experiencing mild left side and left lower abdominal pain. Pt denies fevers, gross hematuria, dysuria, and urinary frequency. Pt was examined by her urologist and has been scheduled for left ESWL. Pt denies CP, SOB, or dizziness.     Past Medical History:   Diagnosis Date    Abscess of nose 2024    Abscess, furuncle and carbuncle of nose 2018    Cellulitis of nose, external    Acute left ankle pain 2024    Allergic conjunctivitis of both eyes 2024    Anxiety     Anxiety disorder, unspecified 2021    Anxiety    Autism ([facility])     Chronic nausea     Conjunctivitis 2024    COVID     DDD (degenerative disc disease), lumbar     Dental infection 2024    Depression     Diarrhea 2024    Fibromyalgia     GERD (gastroesophageal reflux disease)     Hyperlipidemia     Hypertension     Lower back pain     Migraines     Otalgia 2024    Personal history of other diseases of the digestive system 2021    History of irritable bowel syndrome    Personal history of other mental and behavioral disorders 2022    History of depression    Personal history of other mental and behavioral disorders     History of autism    Strain of left ankle 2024       Past Surgical History:   Procedure Laterality Date    KIDNEY STONE SURGERY      LITHOTRIPSY      MOUTH SURGERY  2016    [place] teeth extraction    OTHER SURGICAL HISTORY  2025    MA CYSTO W/URETEROSCOPY W/LITHOTRIPSY     Social History "     Tobacco Use    Smoking status: Former     Current packs/day: 0.00     Average packs/day: 1 pack/day for 16.0 years (16.0 ttl pk-yrs)     Types: Cigarettes     Start date: 2007     Quit date: 2023     Years since quittin.3    Smokeless tobacco: Never   Substance Use Topics    Alcohol use: Yes     Comment: RARE     Social History     Substance and Sexual Activity   Drug Use Yes    Types: Marijuana    Comment: THC GUMMIES RECREATIONAL -       Patient  has no history on file for sexual activity.    Family History[1]    Allergies   Allergen Reactions    Shellfish Derived Hives    Adhesive Tape-Silicones Itching and Rash     Current Outpatient Medications   Medication Sig Dispense Refill    albuterol (ProAir HFA) 90 mcg/actuation inhaler Inhale 2 puffs every 6 hours if needed for shortness of breath. 18 g 2    carvedilol (Coreg) 12.5 mg tablet Take 1 tablet by mouth twice daily 60 tablet 11    cetirizine (ZyrTEC) 10 mg tablet Take 1 tablet (10 mg) by mouth 2 times a day.      cholecalciferol (Vitamin D-3) 5,000 Units tablet Take 1 tablet (125 mcg) by mouth once daily.      cyclobenzaprine (Flexeril) 10 mg tablet Take 1 tablet (10 mg) by mouth 3 times a day as needed for muscle spasms. 90 tablet 0    DULoxetine (Cymbalta) 30 mg DR capsule Take 1 capsule (30 mg) by mouth once daily.      DULoxetine (Cymbalta) 60 mg DR capsule Take 1 capsule (60 mg) by mouth once daily.      hydrOXYzine pamoate (Vistaril) 50 mg capsule Take 1 capsule (50 mg) by mouth 3 times a day as needed for anxiety.      LORazepam (Ativan) 0.5 mg tablet Take 1 tablet (0.5 mg) by mouth once daily as needed for anxiety.      Loryna, 28, 3-0.02 mg tablet Take by mouth once daily.      multivitamin tablet Take 1 tablet by mouth once daily.      ondansetron (Zofran) 4 mg tablet TAKE 1 TABLET BY MOUTH EVERY 8 HOURS AS NEEDED FOR NAUSEA OR VOMITING 20 tablet 0    pantoprazole (ProtoNix) 40 mg EC tablet TAKE 1 TABLET BY MOUTH TWICE DAILY.  SWALLOW WHOLE, DO NOT CRUSH, SPLIT OR CHEW. 60 tablet 2    pregabalin (Lyrica) 75 mg capsule Take 1 capsule (75 mg) by mouth 2 times a day. 60 capsule 0    rosuvastatin (Crestor) 10 mg tablet Take 1 tablet (10 mg) by mouth once daily at bedtime. 100 tablet 3    SUMAtriptan (Imitrex) 100 mg tablet Take 1 tablet (100 mg) by mouth 1 time if needed for migraine. PRN 9 tablet 0    topiramate (Topamax) 50 mg tablet Take 1 tablet (50 mg) by mouth 2 times a day. 200 tablet 1    traZODone (Desyrel) 100 mg tablet Take 1 tablet (100 mg) by mouth once daily at bedtime.      triamcinolone (Kenalog) 0.1 % ointment        No current facility-administered medications for this visit.     Review of Systems   Constitutional: Negative.    HENT: Negative.     Eyes: Negative.    Respiratory: Negative.     Cardiovascular: Negative.    Gastrointestinal: Negative.    Endocrine: Negative.    Genitourinary:         History of kidney stones   Musculoskeletal:  Positive for back pain (chronic lower back pain).   Skin: Negative.    Neurological: Negative.    Hematological: Negative.    Psychiatric/Behavioral: Negative.       Physical Exam  Vitals reviewed.   Constitutional:       Appearance: Mal is morbidly obese.   HENT:      Head: Normocephalic and atraumatic.      Nose: Nose normal.      Mouth/Throat:      Mouth: Mucous membranes are moist.      Pharynx: Oropharynx is clear.   Eyes:      Extraocular Movements: Extraocular movements intact.      Conjunctiva/sclera: Conjunctivae normal.      Pupils: Pupils are equal, round, and reactive to light.   Cardiovascular:      Rate and Rhythm: Normal rate and regular rhythm.      Pulses: Normal pulses.      Heart sounds: Normal heart sounds.   Pulmonary:      Effort: Pulmonary effort is normal. No respiratory distress.      Breath sounds: Normal breath sounds. No wheezing, rhonchi or rales.   Abdominal:      General: Bowel sounds are normal.      Palpations: Abdomen is soft.      Tenderness: There is  "no abdominal tenderness. There is no right CVA tenderness, left CVA tenderness, guarding or rebound.   Musculoskeletal:      Cervical back: Normal range of motion and neck supple.      Comments: Ambulates with a cane   Skin:     General: Skin is warm and dry.   Neurological:      General: No focal deficit present.      Mental Status: Mal is alert. Mental status is at baseline.   Psychiatric:         Mood and Affect: Mood normal.         Behavior: Behavior normal.         Thought Content: Thought content normal.         Judgment: Judgment normal.          PAT AIRWAY:   Airway:     Mallampati::  III    TM distance::  >3 FB    Neck ROM::  Full  normal        Visit Vitals  /84   Pulse 96   Temp 36.2 °C (97.2 °F) (Axillary)   Resp 16   Ht 1.651 m (5' 5\")   Wt (!) 151 kg (332 lb)   SpO2 100%   BMI 55.25 kg/m²   OB Status OCP   Smoking Status Former   BSA 2.63 m²     ASA: 3   CHADS: 2.8%  RCRI: 0.4%  Ariscat: 1.6%  DASI Risk Score      Flowsheet Row Pre-Admission Testing from 6/26/2025 in [facility] Pre-Admission Testing from 2/25/2025 in [facility]   Can you take care of yourself (eat, dress, bathe, or use toilet)?  2.75 filed at 06/26/2025 1509 2.75 filed at 02/25/2025 0753   Can you walk indoors, such as around your house? 1.75 filed at 06/26/2025 1509 1.75 filed at 02/25/2025 0753   Can you walk a block or two on level ground?  0  [WALKS WITH CANE PRN, WINDED WITH ACTIVITY] filed at 06/26/2025 1509 2.75 filed at 02/25/2025 0753   Can you climb a flight of stairs or walk up a hill? 0 filed at 06/26/2025 1509 5.5 filed at 02/25/2025 0753   Can you run a short distance? 0 filed at 06/26/2025 1509 0 filed at 02/25/2025 0753   Can you do light work around the house like dusting or washing dishes? 2.7 filed at 06/26/2025 1509 2.7 filed at 02/25/2025 0753   Can you do moderate work around the house like vacuuming, sweeping floors or carrying groceries? 3.5 filed at 06/26/2025 1509 3.5 " filed at 02/25/2025 0753   Can you do heavy work around the house like scrubbing floors or lifting and moving heavy furniture?  0 filed at 06/26/2025 1509 0 filed at 02/25/2025 0753   Can you do yard work like raking leaves, weeding or pushing a mower? 0 filed at 06/26/2025 1509 0 filed at 02/25/2025 0753   Can you have sexual relations? 5.25 filed at 06/26/2025 1509 5.25 filed at 02/25/2025 0753   Can you participate in moderate recreational activities like golf, bowling, dancing, doubles tennis or throwing a baseball or football? 0 filed at 06/26/2025 1509 0 filed at 02/25/2025 0753   Can you participate in strenous sports like swimming, singles tennis, football, basketball, or skiing? 0 filed at 06/26/2025 1509 0 filed at 02/25/2025 0753   DASI SCORE 15.95 filed at 06/26/2025 1509 24.2 filed at 02/25/2025 0753   METS Score (Will be calculated only when all the questions are answered) 4.7 filed at 06/26/2025 1509 5.7 filed at 02/25/2025 0753          Caprini DVT Assessment    No data to display       Modified Frailty Index    No data to display       [id number] Stroke Risk Points  Current as of 2 minutes ago        N/A 0 to 9 Points:      Last Change: N/A          The [id number] risk score (Lip GH, et al. 2009. © 2010 [employer]) quantifies the risk of stroke for a patient with atrial fibrillation. For patients without atrial fibrillation or under the age of 18 this score appears as N/A. Higher score values generally indicate higher risk of stroke.        This score is not applicable to this patient. Components are not calculated.          Revised Cardiac Risk Index      Flowsheet Row Pre-Admission Testing from 6/26/2025 in [facility] Pre-Admission Testing from 2/25/2025 in [facility]   High-Risk Surgery (Intraperitoneal, Intrathoracic,Suprainguinal vascular) 0 filed at 06/26/2025 1528 0 filed at 02/25/2025 0817   History of ischemic heart disease  (History of MI, History of positive exercuse test, Current chest paint considered due to myocardial ischemia, Use of nitrate therapy, ECG with pathological Q Waves) 0 filed at 06/26/2025 1528 0 filed at 02/25/2025 0817   History of congestive heart failure (pulmonary edemia, bilateral rales or S3 gallop, Paroxysmal nocturnal dyspnea, CXR showing pulmonary vascular redistribution) 0 filed at 06/26/2025 1528 0 filed at 02/25/2025 0817   History of cerebrovascular disease (Prior TIA or stroke) 0 filed at 06/26/2025 1528 0 filed at 02/25/2025 0817   Pre-operative insulin treatment 0 filed at 06/26/2025 1528 0 filed at 02/25/2025 0817   Pre-operative creatinine>2 mg/dl 0 filed at 06/26/2025 1528 0 filed at 02/25/2025 0817   Revised Cardiac Risk Calculator 0 filed at 06/26/2025 1528 0 filed at 02/25/2025 0817          Apfel Simplified Score    No data to display       Risk Analysis Index Results This Encounter    No data found in the last 10 encounters.       Stop Bang Score      Flowsheet Row Pre-Admission Testing from 6/26/2025 in [facility] Pre-Admission Testing from 2/25/2025 in [facility]   Do you snore loudly? 0 filed at 06/26/2025 1510 0 filed at 02/25/2025 0753   Do you often feel tired or fatigued after your sleep? 1 filed at 06/26/2025 1510 1 filed at 02/25/2025 0753   Has anyone ever observed you stop breathing in your sleep? 0 filed at 06/26/2025 1510 0 filed at 02/25/2025 0753   Do you have or are you being treated for high blood pressure? 1 filed at 06/26/2025 1510 1 filed at 02/25/2025 0753   Recent BMI (Calculated) 53.1 filed at 06/26/2025 1510 53.1 filed at 02/25/2025 0753   Is BMI greater than 35 kg/m2? 1=Yes filed at 06/26/2025 1510 1=Yes filed at 02/25/2025 0753   Age older than 50 years old? 0=No filed at 06/26/2025 1510 0=No filed at 02/25/2025 0753   Is your neck circumference greater than 17 inches (Male) or 16 inches (Female)? 1 filed at 06/26/2025 1510 1 filed at  02/25/2025 0753   Gender - Male 1=Yes filed at 06/26/2025 1510 1=Yes filed at 02/25/2025 0753   STOP-BANG Total Score 5 filed at 06/26/2025 1510 5 filed at 02/25/2025 0753          Prodigy: High Risk  Total Score: 0          ARISCAT Score for Postoperative Pulmonary Complications    No data to display       Cosme Perioperative Risk for Myocardial Infarction or Cardiac Arrest (ROQUE)    No data to display         Assessment and Plan:     Calculus of kidney: Electracorporeal Shock Wave Lithotripsy left.  HTN: pt is taking carvedilol.   Hyperlipidemia: pt is taking rosuvastatin.   GERD: pt is taking pantoprazole.   BMI: 55.25  Occasional THC gummy use.  IBS  Migraines: Pt can use sumatriptan as needed.   Seasonal allergies:  Anxiety/Depression: pt is taking duloxetine. Pt can take Vistaril and lorazepam as needed.   Chronic nausea: pt takes Zofran as needed.   Fibromyalgia: followed by pain management.   Autism  Chronic lower back pain: pt is followed by pain management. Pt had recent steroid back injection.     CBC and BMP ordered in PAT.  EKG completed on 2/25/2025.    SAMMI Haynes    6/27/2025:  Dr Lara was notified about the patient's elevated WBC count.   SAMMI Haynes         [1]   Family History  Problem Relation Name Age of Onset    Asthma Sister      Crohn's disease Brother

## 2025-06-26 NOTE — PREPROCEDURE INSTRUCTIONS
Why must I stop eating and drinking near surgery time?  With sedation, food or liquid in your stomach can enter your lungs causing serious complications  Increases nausea and vomiting    When do I need to stop eating and drinking before my surgery?   Do not eat or drink after midnight the night before your surgery/procedure.  You may have small sips of water to take your medication.    PAT DISCHARGE INSTRUCTIONS    The Same Day Surgery (SDS) Department of the hospital where your procedure will be performed will contact you after 2:00 PM the day before your surgery. If you are scheduled on a Monday, or a Tuesday following a Monday holiday, they will call on the last business day prior to your surgery.  Please check your voicemail for any missed messages.    [place] [id number] [phone]  Second Floor      Please let your surgeon know if:      You develop any open sores, shingles, burning or painful urination as these may increase your risk of an infection.   You no longer wish to have the surgery.   Any other personal circumstances change that may lead to the need to cancel or defer this surgery-such as being sick or getting admitted to any hospital within one week of your planned procedure.    Your contact details change, such as a change of address or phone number.    Starting now:     Please DO NOT drink alcohol or smoke for 24 hours before surgery. It is well known that quitting smoking can make a huge difference to your health and recovery from surgery. The longer you abstain from smoking, the better your chances of a healthy recovery. If you need help with quitting, call 0-800QUIT-NOW to be connected to a trained counselor who will discuss the best methods to help you quit.     Before your surgery:    Please stop all supplements 7 days prior to surgery. Or as directed by your surgeon.   Please stop taking NSAID pain medicine such as  Advil and Motrin 7 days before surgery.    If you develop any fever, cough, cold, rashes, cuts, scratches, scrapes, urinary symptoms or infection anywhere on your body (including teeth and gums) prior to surgery, please call your surgeon’s office as soon as possible. This may require treatment to reduce the chance of cancellation on the day of surgery.    The day before your surgery:   DIET- Please follow the diet instructions at the top of your packet.   Get a good night’s rest.  Use the special soap for bathing if you have been instructed to use one.    Scheduled surgery times may change and you will be notified if this occurs - please check your personal voicemail for any updates.     On the morning of surgery:   Wear comfortable, loose fitting clothes which open in the front. Please do not wear moisturizers, creams, lotions, makeup or perfume.    Please bring with you to surgery:   Photo ID and insurance card   Current list of medicines and allergies   Pacemaker/ Defibrillator/Heart stent cards   CPAP machine and mask    Slings/ splints/ crutches   A copy of your complete advanced directive/DHPOA.    Please do NOT bring with you to surgery:   All jewelry and valuables should be left at home.   Prosthetic devices such as contact lenses, hearing aids, dentures, eyelash extensions, hairpins and body piercings must be removed prior to going in to the surgical suite.    After outpatient surgery:   A responsible adult MUST accompany you at the time of discharge and stay with you for 24 hours after your surgery. You may NOT drive yourself home after surgery.    Do not drive, operate machinery, make critical decisions or do activities that require co-ordination or balance until after a night’s sleep.   Do not drink alcoholic beverages for 24 hours.   Instructions for resuming your medications will be provided by your surgeon.    CALL YOUR DOCTOR AFTER SURGERY IF YOU HAVE:     Chills and/or a fever of 101° F or higher.     Redness, swelling, pus or drainage from your surgical wound or a bad smell from the wound.    Lightheadedness, fainting or confusion.    Persistent vomiting (throwing up) and are not able to eat or drink for 12 hours.    Three or more loose, watery bowel movements in 24 hours (diarrhea).   Difficulty or pain while urinating( after non-urological surgery)    Pain and swelling in your legs, especially if it is only on one side.    Difficulty breathing or are breathing faster than normal.    Any new concerning symptoms.           Medication List            Accurate as of June 26, 2025  3:19 PM. Always use your most recent med list.                albuterol 90 mcg/actuation inhaler  Commonly known as: ProAir HFA  Inhale 2 puffs every 6 hours if needed for shortness of breath.  Medication Adjustments for Surgery: Take/Use as prescribed     carvedilol 12.5 mg tablet  Commonly known as: Coreg  Take 1 tablet by mouth twice daily  Medication Adjustments for Surgery: Take/Use as prescribed     cholecalciferol 125 mcg (5,000 units) tablet  Commonly known as: Vitamin D-3  Additional Medication Adjustments for Surgery: Take last dose 7 days before surgery     cyclobenzaprine 10 mg tablet  Commonly known as: Flexeril  Take 1 tablet (10 mg) by mouth 3 times a day as needed for muscle spasms.  Additional Medication Adjustments for Surgery: Take last dose 7 days before surgery     * DULoxetine 60 mg DR capsule  Commonly known as: Cymbalta  Medication Adjustments for Surgery: Take/Use as prescribed     * DULoxetine 30 mg DR capsule  Commonly known as: Cymbalta  Medication Adjustments for Surgery: Take/Use as prescribed     hydrOXYzine pamoate 50 mg capsule  Commonly known as: Vistaril  Medication Adjustments for Surgery: Do Not take on the morning of surgery     LORazepam 0.5 mg tablet  Commonly known as: Ativan  Medication Adjustments for Surgery: Take/Use as prescribed     Loryna (28) 3-0.02 mg tablet  Generic drug:  drospirenone-ethinyl estradiol  Medication Adjustments for Surgery: Take/Use as prescribed     multivitamin tablet  Additional Medication Adjustments for Surgery: Take last dose 7 days before surgery     ondansetron 4 mg tablet  Commonly known as: Zofran  TAKE 1 TABLET BY MOUTH EVERY 8 HOURS AS NEEDED FOR NAUSEA OR VOMITING  Medication Adjustments for Surgery: Take/Use as prescribed     pantoprazole 40 mg EC tablet  Commonly known as: ProtoNix  TAKE 1 TABLET BY MOUTH TWICE DAILY. SWALLOW WHOLE, DO NOT CRUSH, SPLIT OR CHEW.  Medication Adjustments for Surgery: Take/Use as prescribed     pregabalin 75 mg capsule  Commonly known as: Lyrica  Take 1 capsule (75 mg) by mouth 2 times a day.  Medication Adjustments for Surgery: Take/Use as prescribed     rosuvastatin 10 mg tablet  Commonly known as: Crestor  Take 1 tablet (10 mg) by mouth once daily at bedtime.  Medication Adjustments for Surgery: Take/Use as prescribed     SUMAtriptan 100 mg tablet  Commonly known as: Imitrex  Take 1 tablet (100 mg) by mouth 1 time if needed for migraine. PRN  Medication Adjustments for Surgery: Do Not take on the morning of surgery     topiramate 50 mg tablet  Commonly known as: Topamax  Take 1 tablet (50 mg) by mouth 2 times a day.  Medication Adjustments for Surgery: Take/Use as prescribed     traZODone 100 mg tablet  Commonly known as: Desyrel  Medication Adjustments for Surgery: Take/Use as prescribed     triamcinolone 0.1 % ointment  Commonly known as: Kenalog  Medication Adjustments for Surgery: Take/Use as prescribed     ZyrTEC 10 mg tablet  Generic drug: cetirizine  Medication Adjustments for Surgery: Take on the morning of surgery           * This list has 2 medication(s) that are the same as other medications prescribed for you. Read the directions carefully, and ask your doctor or other care provider to review them with you.

## 2025-06-30 ENCOUNTER — HOSPITAL ENCOUNTER (OUTPATIENT)
Facility: HOSPITAL | Age: 35
Setting detail: OUTPATIENT SURGERY
Discharge: HOME | End: 2025-06-30
Attending: UROLOGY | Admitting: UROLOGY
Payer: MEDICARE

## 2025-06-30 ENCOUNTER — ANESTHESIA (OUTPATIENT)
Dept: OPERATING ROOM | Facility: HOSPITAL | Age: 35
End: 2025-06-30
Payer: MEDICARE

## 2025-06-30 ENCOUNTER — ANESTHESIA EVENT (OUTPATIENT)
Dept: OPERATING ROOM | Facility: HOSPITAL | Age: 35
End: 2025-06-30
Payer: MEDICARE

## 2025-06-30 VITALS
SYSTOLIC BLOOD PRESSURE: 132 MMHG | BODY MASS INDEX: 55.4 KG/M2 | TEMPERATURE: 97.3 F | OXYGEN SATURATION: 100 % | DIASTOLIC BLOOD PRESSURE: 86 MMHG | RESPIRATION RATE: 20 BRPM | WEIGHT: 293 LBS | HEART RATE: 101 BPM

## 2025-06-30 LAB — PREGNANCY TEST URINE, POC: NEGATIVE

## 2025-06-30 PROCEDURE — A50590 PR FRAGMENT KIDNEY STONE/ ESWL: Performed by: ANESTHESIOLOGY

## 2025-06-30 PROCEDURE — 2720000007 HC OR 272 NO HCPCS: Performed by: UROLOGY

## 2025-06-30 PROCEDURE — 2500000004 HC RX 250 GENERAL PHARMACY W/ HCPCS (ALT 636 FOR OP/ED): Performed by: UROLOGY

## 2025-06-30 PROCEDURE — A50590 PR FRAGMENT KIDNEY STONE/ ESWL

## 2025-06-30 PROCEDURE — 7100000001 HC RECOVERY ROOM TIME - INITIAL BASE CHARGE: Performed by: UROLOGY

## 2025-06-30 PROCEDURE — 2500000004 HC RX 250 GENERAL PHARMACY W/ HCPCS (ALT 636 FOR OP/ED)

## 2025-06-30 PROCEDURE — 7100000010 HC PHASE TWO TIME - EACH INCREMENTAL 1 MINUTE: Performed by: UROLOGY

## 2025-06-30 PROCEDURE — 3600000008 HC OR TIME - EACH INCREMENTAL 1 MINUTE - PROCEDURE LEVEL THREE: Performed by: UROLOGY

## 2025-06-30 PROCEDURE — 7100000002 HC RECOVERY ROOM TIME - EACH INCREMENTAL 1 MINUTE: Performed by: UROLOGY

## 2025-06-30 PROCEDURE — 7100000009 HC PHASE TWO TIME - INITIAL BASE CHARGE: Performed by: UROLOGY

## 2025-06-30 PROCEDURE — 81025 URINE PREGNANCY TEST: CPT | Performed by: UROLOGY

## 2025-06-30 PROCEDURE — 3600000003 HC OR TIME - INITIAL BASE CHARGE - PROCEDURE LEVEL THREE: Performed by: UROLOGY

## 2025-06-30 PROCEDURE — 3700000002 HC GENERAL ANESTHESIA TIME - EACH INCREMENTAL 1 MINUTE: Performed by: UROLOGY

## 2025-06-30 PROCEDURE — 3700000001 HC GENERAL ANESTHESIA TIME - INITIAL BASE CHARGE: Performed by: UROLOGY

## 2025-06-30 RX ORDER — LIDOCAINE HYDROCHLORIDE 20 MG/ML
INJECTION, SOLUTION EPIDURAL; INFILTRATION; INTRACAUDAL; PERINEURAL AS NEEDED
Status: DISCONTINUED | OUTPATIENT
Start: 2025-06-30 | End: 2025-06-30

## 2025-06-30 RX ORDER — FENTANYL CITRATE 50 UG/ML
50 INJECTION, SOLUTION INTRAMUSCULAR; INTRAVENOUS EVERY 5 MIN PRN
Status: DISCONTINUED | OUTPATIENT
Start: 2025-06-30 | End: 2025-06-30 | Stop reason: HOSPADM

## 2025-06-30 RX ORDER — LABETALOL HYDROCHLORIDE 5 MG/ML
10 INJECTION, SOLUTION INTRAVENOUS ONCE
Status: DISCONTINUED | OUTPATIENT
Start: 2025-06-30 | End: 2025-06-30 | Stop reason: HOSPADM

## 2025-06-30 RX ORDER — ONDANSETRON HYDROCHLORIDE 2 MG/ML
INJECTION, SOLUTION INTRAVENOUS AS NEEDED
Status: DISCONTINUED | OUTPATIENT
Start: 2025-06-30 | End: 2025-06-30

## 2025-06-30 RX ORDER — SODIUM CHLORIDE, SODIUM LACTATE, POTASSIUM CHLORIDE, CALCIUM CHLORIDE 600; 310; 30; 20 MG/100ML; MG/100ML; MG/100ML; MG/100ML
INJECTION, SOLUTION INTRAVENOUS CONTINUOUS PRN
Status: DISCONTINUED | OUTPATIENT
Start: 2025-06-30 | End: 2025-06-30

## 2025-06-30 RX ORDER — LIDOCAINE HYDROCHLORIDE 10 MG/ML
0.1 INJECTION, SOLUTION INFILTRATION; PERINEURAL ONCE
Status: DISCONTINUED | OUTPATIENT
Start: 2025-06-30 | End: 2025-06-30 | Stop reason: HOSPADM

## 2025-06-30 RX ORDER — HYDRALAZINE HYDROCHLORIDE 20 MG/ML
5 INJECTION INTRAMUSCULAR; INTRAVENOUS EVERY 30 MIN PRN
Status: DISCONTINUED | OUTPATIENT
Start: 2025-06-30 | End: 2025-06-30 | Stop reason: HOSPADM

## 2025-06-30 RX ORDER — MEPERIDINE HYDROCHLORIDE 25 MG/ML
12.5 INJECTION INTRAMUSCULAR; INTRAVENOUS; SUBCUTANEOUS EVERY 10 MIN PRN
Status: DISCONTINUED | OUTPATIENT
Start: 2025-06-30 | End: 2025-06-30 | Stop reason: HOSPADM

## 2025-06-30 RX ORDER — MIDAZOLAM HYDROCHLORIDE 1 MG/ML
1 INJECTION, SOLUTION INTRAMUSCULAR; INTRAVENOUS ONCE AS NEEDED
Status: DISCONTINUED | OUTPATIENT
Start: 2025-06-30 | End: 2025-06-30 | Stop reason: HOSPADM

## 2025-06-30 RX ORDER — FENTANYL CITRATE 50 UG/ML
INJECTION, SOLUTION INTRAMUSCULAR; INTRAVENOUS AS NEEDED
Status: DISCONTINUED | OUTPATIENT
Start: 2025-06-30 | End: 2025-06-30

## 2025-06-30 RX ORDER — ONDANSETRON HYDROCHLORIDE 2 MG/ML
4 INJECTION, SOLUTION INTRAVENOUS ONCE AS NEEDED
Status: DISCONTINUED | OUTPATIENT
Start: 2025-06-30 | End: 2025-06-30 | Stop reason: HOSPADM

## 2025-06-30 RX ORDER — CEFAZOLIN SODIUM IN 0.9 % NACL 3 G/100 ML
3 INTRAVENOUS SOLUTION, PIGGYBACK (ML) INTRAVENOUS ONCE
Status: COMPLETED | OUTPATIENT
Start: 2025-06-30 | End: 2025-06-30

## 2025-06-30 RX ORDER — MIDAZOLAM HYDROCHLORIDE 1 MG/ML
INJECTION, SOLUTION INTRAMUSCULAR; INTRAVENOUS AS NEEDED
Status: DISCONTINUED | OUTPATIENT
Start: 2025-06-30 | End: 2025-06-30

## 2025-06-30 RX ORDER — SODIUM CHLORIDE, SODIUM LACTATE, POTASSIUM CHLORIDE, CALCIUM CHLORIDE 600; 310; 30; 20 MG/100ML; MG/100ML; MG/100ML; MG/100ML
100 INJECTION, SOLUTION INTRAVENOUS CONTINUOUS
Status: DISCONTINUED | OUTPATIENT
Start: 2025-06-30 | End: 2025-06-30 | Stop reason: HOSPADM

## 2025-06-30 RX ORDER — ROCURONIUM BROMIDE 10 MG/ML
INJECTION, SOLUTION INTRAVENOUS AS NEEDED
Status: DISCONTINUED | OUTPATIENT
Start: 2025-06-30 | End: 2025-06-30

## 2025-06-30 RX ORDER — ALBUTEROL SULFATE 0.83 MG/ML
2.5 SOLUTION RESPIRATORY (INHALATION) ONCE AS NEEDED
Status: DISCONTINUED | OUTPATIENT
Start: 2025-06-30 | End: 2025-06-30 | Stop reason: HOSPADM

## 2025-06-30 RX ORDER — PROPOFOL 10 MG/ML
INJECTION, EMULSION INTRAVENOUS AS NEEDED
Status: DISCONTINUED | OUTPATIENT
Start: 2025-06-30 | End: 2025-06-30

## 2025-06-30 RX ADMIN — MIDAZOLAM 2 MG: 1 INJECTION INTRAMUSCULAR; INTRAVENOUS at 12:14

## 2025-06-30 RX ADMIN — SUGAMMADEX 400 MG: 100 INJECTION, SOLUTION INTRAVENOUS at 13:00

## 2025-06-30 RX ADMIN — ONDANSETRON 4 MG: 2 INJECTION INTRAMUSCULAR; INTRAVENOUS at 12:53

## 2025-06-30 RX ADMIN — ROCURONIUM BROMIDE 50 MG: 10 INJECTION, SOLUTION INTRAVENOUS at 12:20

## 2025-06-30 RX ADMIN — FENTANYL CITRATE 25 MCG: 50 INJECTION, SOLUTION INTRAMUSCULAR; INTRAVENOUS at 12:46

## 2025-06-30 RX ADMIN — FENTANYL CITRATE 25 MCG: 50 INJECTION, SOLUTION INTRAMUSCULAR; INTRAVENOUS at 12:36

## 2025-06-30 RX ADMIN — LIDOCAINE HYDROCHLORIDE 100 MG: 20 INJECTION, SOLUTION EPIDURAL; INFILTRATION; INTRACAUDAL; PERINEURAL at 12:19

## 2025-06-30 RX ADMIN — DEXAMETHASONE SODIUM PHOSPHATE 6 MG: 4 INJECTION, SOLUTION INTRAMUSCULAR; INTRAVENOUS at 12:24

## 2025-06-30 RX ADMIN — SODIUM CHLORIDE, POTASSIUM CHLORIDE, SODIUM LACTATE AND CALCIUM CHLORIDE: 600; 310; 30; 20 INJECTION, SOLUTION INTRAVENOUS at 12:14

## 2025-06-30 RX ADMIN — FENTANYL CITRATE 50 MCG: 50 INJECTION, SOLUTION INTRAMUSCULAR; INTRAVENOUS at 12:19

## 2025-06-30 RX ADMIN — Medication 3 G: at 12:25

## 2025-06-30 RX ADMIN — PROPOFOL 150 MG: 10 INJECTION, EMULSION INTRAVENOUS at 12:19

## 2025-06-30 SDOH — HEALTH STABILITY: MENTAL HEALTH: CURRENT SMOKER: 0

## 2025-06-30 ASSESSMENT — PAIN SCALES - GENERAL
PAINLEVEL_OUTOF10: 4
PAIN_LEVEL: 2
PAINLEVEL_OUTOF10: 4
PAINLEVEL_OUTOF10: 4

## 2025-06-30 ASSESSMENT — COLUMBIA-SUICIDE SEVERITY RATING SCALE - C-SSRS
1. IN THE PAST MONTH, HAVE YOU WISHED YOU WERE DEAD OR WISHED YOU COULD GO TO SLEEP AND NOT WAKE UP?: NO
6. HAVE YOU EVER DONE ANYTHING, STARTED TO DO ANYTHING, OR PREPARED TO DO ANYTHING TO END YOUR LIFE?: NO
2. HAVE YOU ACTUALLY HAD ANY THOUGHTS OF KILLING YOURSELF?: NO

## 2025-06-30 ASSESSMENT — PAIN DESCRIPTION - DESCRIPTORS
DESCRIPTORS: ACHING;NAGGING;SPASM
DESCRIPTORS: NAGGING;SPASM

## 2025-06-30 ASSESSMENT — PAIN - FUNCTIONAL ASSESSMENT
PAIN_FUNCTIONAL_ASSESSMENT: 0-10

## 2025-06-30 NOTE — OP NOTE
"Electracorporeal Shock Wave Lithotripsy (L) Operative Note     Date: 2025  OR Location: [facility]    Name: Bree Lamar \"Mla\", : 1990, Age: 34 y.o., MRN: [id number], Sex: adult    Diagnosis  Pre-op Diagnosis      * Calculus of kidney [N20.0] Post-op Diagnosis     * Calculus of kidney [N20.0]     Procedures  Electracorporeal Shock Wave Lithotripsy  21599 - [place] LITHOTRIPSY XTRCORP SHOCK WAVE      Surgeons      * Nahed Lara - Primary    Resident/Fellow/Other Assistant:  Surgeons and Role:  * No surgeons found with a matching role *    Staff:   Circulator: Coy Brewster Person: Rayray    Anesthesia Staff: Anesthesiologist: Callum Todd MD  CRNA: MAUREEN Katz-CRNA, CARIDAD  C-AA: JOEL Hsieh    Procedure Summary  Anesthesia: General  ASA: II  Estimated Blood Loss: 0mL  Intra-op Medications:   Administrations occurring from 1200 to 1330 on 25:   Medication Name Total Dose   dexAMETHasone (Decadron) 4 mg/mL IV Syringe 2 mL 6 mg   fentaNYL (Sublimaze) injection 50 mcg/mL 100 mcg   LR infusion Cannot be calculated   lidocaine PF (Xylocaine-MPF) local injection 2 % 100 mg   midazolam (Versed) injection 1 mg/mL 2 mg   ondansetron (Zofran) 2 mg/mL injection 4 mg   propofol (Diprivan) injection 10 mg/mL 150 mg   rocuronium (ZeMuron) 50 mg/5 mL injection 50 mg   sugammadex (Bridion) 200 mg/2 mL injection 400 mg   ceFAZolin (Ancef) 3 g in sodium chloride 0.9%  mL 3 g              Anesthesia Record               Intraprocedure I/O Totals          Intake    ceFAZolin (Ancef) 3 g in sodium chloride 0.9%  mL 100.00 mL    Total Intake 100 mL          Specimen: No specimens collected              Drains and/or Catheters: * None in log *    Tourniquet Times:         Implants:     Findings: good breakage noted    Indications: Bree Lamar \"Mal\" is an 34 y.o. adult who is having surgery for N20.0 calculus of kidney.      The patient was seen in the preoperative area. " The risks, benefits, complications, treatment options, non-operative alternatives, expected recovery and outcomes were discussed with the patient. The possibilities of reaction to medication, pulmonary aspiration, injury to surrounding structures, bleeding, recurrent infection, the need for additional procedures, failure to diagnose a condition, and creating a complication requiring transfusion or operation were discussed with the patient. The patient concurred with the proposed plan, giving informed consent.  The site of surgery was properly noted/marked if necessary per policy. The patient has been actively warmed in preoperative area. Preoperative antibiotics have been ordered and given within 1 hours of incision. Venous thrombosis prophylaxis have been ordered including bilateral sequential compression devices    Procedure Details: After induction, in supine position, stone were localized with flouroscopy and the patient underwent 300 shocks for excellent breakage seen.  Evidence of Infection: No   Complications:  None; patient tolerated the procedure well.    Disposition: PACU - hemodynamically stable.  Condition: stable                 Additional Details:      Attending Attestation: I performed the procedure.    Nahed Lara  Phone Number: [phone]

## 2025-06-30 NOTE — DISCHARGE SUMMARY
Discharge Diagnosis  Left renal stone    Issues Requiring Follow-Up  Inability to  void, fever    Test Results Pending At Discharge  Pending Labs       No current pending labs.            Hospital Course   Tolerated well    Visit Vitals  BP (!) 163/103   Pulse (!) 112   Temp 36.5 °C (97.7 °F) (Temporal)   Resp 17     Vitals:    06/30/25 1054   Weight: (!) 151 kg (332 lb 14.3 oz)       Immunization History   Administered Date(s) Administered    COVID-19, mRNA, LNP-S, PF, 30 mcg/0.3 mL dose 04/07/2021, 05/07/2021, 12/15/2021    Flu vaccine (IIV4), preservative free *Check age/dose* 10/01/2018, 12/26/2019    Flu vaccine, quadrivalent, recombinant, preservative free, adult (FLUBLOK) 11/11/2020, 12/15/2021    Flu vaccine, trivalent, preservative free, no egg protein, age 18y+ (Flublok) 08/30/2024    Influenza, Unspecified 11/01/2023    Influenza, seasonal, injectable 10/08/2018    PPD Test 08/30/2013    Pneumococcal conjugate vaccine, 20-valent (PREVNAR 20) 06/06/2022    Pneumococcal polysaccharide vaccine, 23-valent, age 2 years and older (PNEUMOVAX 23) 11/11/2020       Results        Pertinent Physical Exam At Time of Discharge  Physical Exam no change    Home Medications     Medication List      CONTINUE taking these medications     albuterol 90 mcg/actuation inhaler; Commonly known as: ProAir HFA;   Inhale 2 puffs every 6 hours if needed for shortness of breath.   carvedilol 12.5 mg tablet; Commonly known as: Coreg; Take 1 tablet by   mouth twice daily   cholecalciferol 125 mcg (5,000 units) tablet; Commonly known as: Vitamin   D-3   cyclobenzaprine 10 mg tablet; Commonly known as: Flexeril; Take 1 tablet   (10 mg) by mouth 3 times a day as needed for muscle spasms.   * DULoxetine 60 mg DR capsule; Commonly known as: Cymbalta   * DULoxetine 30 mg DR capsule; Commonly known as: Cymbalta   hydrOXYzine pamoate 50 mg capsule; Commonly known as: Vistaril   LORazepam 0.5 mg tablet; Commonly known as: Ativan   Loryna (28)  3-0.02 mg tablet; Generic drug: drospirenone-ethinyl   estradiol   multivitamin tablet   ondansetron 4 mg tablet; Commonly known as: Zofran; TAKE 1 TABLET BY   MOUTH EVERY 8 HOURS AS NEEDED FOR NAUSEA OR VOMITING   pantoprazole 40 mg EC tablet; Commonly known as: ProtoNix; TAKE 1 TABLET   BY MOUTH TWICE DAILY. SWALLOW WHOLE, DO NOT CRUSH, SPLIT OR CHEW.   pregabalin 75 mg capsule; Commonly known as: Lyrica; Take 1 capsule (75   mg) by mouth 2 times a day.   rosuvastatin 10 mg tablet; Commonly known as: Crestor; Take 1 tablet (10   mg) by mouth once daily at bedtime.   SUMAtriptan 100 mg tablet; Commonly known as: Imitrex; Take 1 tablet   (100 mg) by mouth 1 time if needed for migraine. PRN   topiramate 50 mg tablet; Commonly known as: Topamax; Take 1 tablet (50   mg) by mouth 2 times a day.   traZODone 100 mg tablet; Commonly known as: Desyrel   triamcinolone 0.1 % ointment; Commonly known as: Kenalog   ZyrTEC 10 mg tablet; Generic drug: cetirizine  * This list has 2 medication(s) that are the same as other medications   prescribed for you. Read the directions carefully, and ask your doctor or   other care provider to review them with you.       Outpatient Follow-Up  Future Appointments   Date Time Provider Department Center   7/18/2025  3:00 PM Melodie Jay PA-C [id number] [facility]   8/4/2025  3:00 PM Eugene Fay MD [id number] [facility]   8/8/2025  4:00 PM Tom Springer DO [id number] [facility]       Nahed Lara MD

## 2025-06-30 NOTE — ANESTHESIA PROCEDURE NOTES
Airway  Date/Time: 6/30/2025 12:23 PM  Reason: elective    Airway not difficult    Staffing  Performed: CRNA   Authorized by: Callum Todd MD    Performed by: MAUREEN Katz-CRNA, CARIDAD  Patient location during procedure: OR    Patient Condition  Indications for airway management: anesthesia and airway protection  Patient position: sniffing  Planned trial extubation  Sedation level: deep     Final Airway Details   Preoxygenated: yes  Final airway type: endotracheal airway  Successful airway: ETT  Cuffed: yes   Successful intubation technique: direct laryngoscopy  Adjuncts used in placement: intubating stylet  Endotracheal tube insertion site: oral  Blade: Maegan  Blade size: #3  ETT size (mm): 7.5  Cormack-Lehane Classification: grade I - full view of glottis  Placement verified by: chest auscultation and capnometry   Measured from: lips  ETT to lips (cm): 21  Number of attempts at approach: 1

## 2025-06-30 NOTE — ANESTHESIA POSTPROCEDURE EVALUATION
"Patient: Bree Lamar \"Mal\"    Procedure Summary       Date: 06/30/25 Room / Location: [facility] / [facility]    Anesthesia Start: 1214 Anesthesia Stop: 1307    Procedure: Electracorporeal Shock Wave Lithotripsy (Left) Diagnosis:       Calculus of kidney      (N20.0 calculus of kidney)    Surgeons: Nahed Lara MD Responsible Provider: Callum Todd MD    Anesthesia Type: general ASA Status: 2            Anesthesia Type: general    Vitals Value Taken Time   /92 06/30/25 13:45   Temp 36 °C (96.8 °F) 06/30/25 13:45   Pulse 96 06/30/25 13:45   Resp 17 06/30/25 13:45   SpO2 93 % 06/30/25 13:45       Anesthesia Post Evaluation    Patient location during evaluation: PACU  Patient participation: complete - patient participated  Level of consciousness: sleepy but conscious  Pain score: 2  Pain management: adequate  Multimodal analgesia pain management approach  Airway patency: patent  Cardiovascular status: acceptable  Respiratory status: acceptable  Hydration status: acceptable  Postoperative Nausea and Vomiting: none        No notable events documented.    "

## 2025-06-30 NOTE — ANESTHESIA PREPROCEDURE EVALUATION
"Patient: Bree Lamar \"Mal\"    Procedure Information       Date/Time: 06/30/25 1200    Procedure: Electracorporeal Shock Wave Lithotripsy (Left) - rep: Chris Hawthorne [phone]  kefzol 3g    Location: [facility] / [facility]    Surgeons: Nahed Lara MD            Relevant Problems   Cardiac   (+) Hyperlipidemia   (+) Hypertension      Pulmonary   (+) CHAN (dyspnea on exertion)      Neuro   (+) Anxiety disorder   (+) Autism ([facility])   (+) Cervical radiculopathy   (+) Lumbar radiculopathy   (+) Mild episode of recurrent major depressive disorder      GI   (+) IBS (irritable bowel syndrome)      /Renal   (+) Calculus of kidney with calculus of ureter      Endocrine   (+) Obesity      Hematology   (+) Anemia      Musculoskeletal   (+) Chronic bilateral low back pain with bilateral sciatica   (+) Fibromyalgia   (+) Osteoarthritis      ID   (+) Candidal intertrigo      GYN   (+) DUB (dysfunctional uterine bleeding)   (+) Menorrhagia with regular cycle       Clinical information reviewed:   Tobacco  Allergies  Meds   Med Hx  Surg Hx  OB Status  Fam Hx  Soc   Hx        NPO Detail:  NPO/Void Status  Date of Last Liquid: 06/30/25  Time of Last Liquid: 0930  Date of Last Solid: 06/30/25  Time of Last Solid: 0010  Last Intake Type: Clear fluids  Time of Last Void: 1058         Physical Exam    Airway  Mallampati: II  TM distance: >3 FB  Neck ROM: full  Mouth opening: 3 or more finger widths     Cardiovascular - normal exam   Dental - normal exam     Pulmonary - normal exam   Abdominal - normal exam           Anesthesia Plan    History of general anesthesia?: yes  History of complications of general anesthesia?: no    ASA 2     general     The patient is not a current smoker.  Patient was not previously instructed to abstain from smoking on day of procedure.  Patient did not smoke on day of procedure.  Education provided regarding risk of obstructive sleep apnea.  intravenous induction   Postoperative " pain plan includes opioids.  Trial extubation is planned.  Anesthetic plan and risks discussed with patient.  Use of blood products discussed with patient who consented to blood products.    Plan discussed with CAA, attending and CRNA.

## 2025-07-01 ENCOUNTER — TELEPHONE (OUTPATIENT)
Dept: PRIMARY CARE | Facility: CLINIC | Age: 35
End: 2025-07-01
Payer: MEDICARE

## 2025-07-01 DIAGNOSIS — R60.0 BILATERAL LEG EDEMA: ICD-10-CM

## 2025-07-01 RX ORDER — ONDANSETRON 4 MG/1
4 TABLET, FILM COATED ORAL EVERY 8 HOURS PRN
Qty: 20 TABLET | Refills: 0 | Status: SHIPPED | OUTPATIENT
Start: 2025-07-01

## 2025-07-01 NOTE — TELEPHONE ENCOUNTER
MEDICATION REFILL    Pt took one a little bit ago, and has 1 more on hand.  Is hoping this can be sent in today.     Pharmacy Name:  [facility]  Medication requested               Zofran    4 mg   Dosage      1 tab every 8 hrs prn nausea  Quantity      30 days    Allergies     nka  Date of last visit  12/19/2024  Date of Next Appointment   08/08/2025

## 2025-07-08 NOTE — DISCHARGE SUMMARY
Discharge Diagnosis  Left ureteral stone    Issues Requiring Follow-Up  Inability to void, fever    Test Results Pending At Discharge  Pending Labs       No current pending labs.            Hospital Course   Tolerated well    Pertinent Physical Exam At Time of Discharge  Physical Exam no change    Home Medications     Medication List      ASK your doctor about these medications     albuterol 90 mcg/actuation inhaler; Commonly known as: ProAir HFA;   Inhale 2 puffs every 6 hours if needed for shortness of breath.   carvedilol 12.5 mg tablet; Commonly known as: Coreg; Take 1 tablet by   mouth twice daily   cholecalciferol 5,000 Units tablet; Commonly known as: Vitamin D-3   cyclobenzaprine 10 mg tablet; Commonly known as: Flexeril; Take 1 tablet   by mouth three times daily as needed for muscle spasm   * DULoxetine 60 mg DR capsule; Commonly known as: Cymbalta   * DULoxetine 30 mg DR capsule; Commonly known as: Cymbalta   hydrOXYzine pamoate 50 mg capsule; Commonly known as: Vistaril   LORazepam 0.5 mg tablet; Commonly known as: Ativan   Loryna (28) 3-0.02 mg tablet; Generic drug: drospirenone-ethinyl   estradiol   ondansetron 4 mg tablet; Commonly known as: Zofran; TAKE 1 TABLET BY   MOUTH EVERY 8 HOURS AS NEEDED FOR NAUSEA OR VOMITING   pantoprazole 40 mg EC tablet; Commonly known as: ProtoNix; TAKE 1 TABLET   BY MOUTH TWICE DAILY ::DO NOT CRUSH, CHEW OR SPLIT::   rosuvastatin 10 mg tablet; Commonly known as: Crestor; Take 1 tablet (10   mg) by mouth once daily at bedtime.   SUMAtriptan 100 mg tablet; Commonly known as: Imitrex; Take 1 tablet   (100 mg) by mouth 1 time if needed for migraine. PRN   tamsulosin 0.4 mg 24 hr capsule; Commonly known as: Flomax; Take 1   capsule by mouth once daily   topiramate 50 mg tablet; Commonly known as: Topamax; Take 1 tablet (50   mg) by mouth 2 times a day.   traZODone 100 mg tablet; Commonly known as: Desyrel   triamcinolone 0.1 % ointment; Commonly known as: Kenalog   ZyrTEC  Obstructive sleep apnea (MAREK) is a condition that makes it hard for you to breathe when you  sleep. People with MAREK often experience the following:   Snoring or making gasping noises when they sleep   Are tired when they wake up or during the day, even if sleep all night   Waking up with headaches   Having trouble focusing on tasks     Next Steps  A sleep test is required to diagnose sleep apnea. People of all ages can have it. MAREK is  most common among men older than age 40; women older than age 50; those who are  overweight; people with high blood pressure; men with a neck size greater than 17 inches; and  women with a neck size greater than 16 inches.     Risks  If MAREK goes untreated, the long-term health risks can include:   High blood pressure   Heart attack   Stroke   Pre-diabetes/Diabetes   Depression     Treatments  There are a variety of treatment options for MAREK patients. Continuous positive airway  pressure, or CPAP for short, lets a stream of air flow from a machine, through tubing to a mask  you wear while you sleep. This flow of air keeps your throat open so that you can breathe  freely and not snore. Another option might be an oral appliance, a mouth guard that holds  your bottom jaw forward and keeps your tongue from blocking your airway while you sleep.     Even with these treatments, there are other things you can do to improve your MAREK, including  weight loss, quitting smoking and not drinking alcohol.         10 mg tablet; Generic drug: cetirizine  * This list has 2 medication(s) that are the same as other medications   prescribed for you. Read the directions carefully, and ask your doctor or   other care provider to review them with you.       Outpatient Follow-Up  Future Appointments   Date Time Provider Department Center   2/26/2025  1:55 PM AFUA C-ARM 1 [facility]       Nahed Lara MD

## 2025-07-15 DIAGNOSIS — M54.16 LUMBAR RADICULOPATHY: ICD-10-CM

## 2025-07-15 RX ORDER — PREGABALIN 75 MG/1
75 CAPSULE ORAL 2 TIMES DAILY
Qty: 60 CAPSULE | Refills: 0 | Status: SHIPPED | OUTPATIENT
Start: 2025-07-15

## 2025-07-18 ENCOUNTER — OFFICE VISIT (OUTPATIENT)
Dept: PAIN MEDICINE | Facility: CLINIC | Age: 35
End: 2025-07-18
Payer: MEDICARE

## 2025-07-18 VITALS — DIASTOLIC BLOOD PRESSURE: 80 MMHG | SYSTOLIC BLOOD PRESSURE: 128 MMHG | HEART RATE: 106 BPM | OXYGEN SATURATION: 98 %

## 2025-07-18 DIAGNOSIS — G89.29 CHRONIC BILATERAL LOW BACK PAIN WITH BILATERAL SCIATICA: ICD-10-CM

## 2025-07-18 DIAGNOSIS — M54.42 CHRONIC BILATERAL LOW BACK PAIN WITH BILATERAL SCIATICA: ICD-10-CM

## 2025-07-18 DIAGNOSIS — M54.16 LUMBAR RADICULOPATHY: Primary | ICD-10-CM

## 2025-07-18 DIAGNOSIS — M79.7 FIBROMYALGIA: ICD-10-CM

## 2025-07-18 DIAGNOSIS — M54.41 CHRONIC BILATERAL LOW BACK PAIN WITH BILATERAL SCIATICA: ICD-10-CM

## 2025-07-18 PROCEDURE — 3079F DIAST BP 80-89 MM HG: CPT | Performed by: PHYSICIAN ASSISTANT

## 2025-07-18 PROCEDURE — G2211 COMPLEX E/M VISIT ADD ON: HCPCS | Performed by: PHYSICIAN ASSISTANT

## 2025-07-18 PROCEDURE — 3074F SYST BP LT 130 MM HG: CPT | Performed by: PHYSICIAN ASSISTANT

## 2025-07-18 PROCEDURE — 99213 OFFICE O/P EST LOW 20 MIN: CPT | Performed by: PHYSICIAN ASSISTANT

## 2025-07-18 ASSESSMENT — ENCOUNTER SYMPTOMS
LOSS OF SENSATION IN FEET: 0
DEPRESSION: 0
OCCASIONAL FEELINGS OF UNSTEADINESS: 0

## 2025-07-18 ASSESSMENT — PATIENT HEALTH QUESTIONNAIRE - PHQ9
SUM OF ALL RESPONSES TO PHQ9 QUESTIONS 1 AND 2: 0
1. LITTLE INTEREST OR PLEASURE IN DOING THINGS: NOT AT ALL
2. FEELING DOWN, DEPRESSED OR HOPELESS: NOT AT ALL

## 2025-07-18 ASSESSMENT — PAIN - FUNCTIONAL ASSESSMENT: PAIN_FUNCTIONAL_ASSESSMENT: 0-10

## 2025-07-18 ASSESSMENT — PAIN SCALES - GENERAL
PAINLEVEL_OUTOF10: 5 - MODERATE PAIN
PAINLEVEL_OUTOF10: 5

## 2025-07-18 ASSESSMENT — PAIN DESCRIPTION - DESCRIPTORS: DESCRIPTORS: ACHING

## 2025-07-18 NOTE — PROGRESS NOTES
Chronic Pain Clinic Follow-Up Evaluation    Date: July 18, 2025 - 3:03 PM    Chief Complaint:   Chief Complaint   Patient presents with   • post LTR       SUBJECTIVE:    Bree Lamar is a 34 y.o. adult who presents to [facility] pain management center for a follow up appointment for ***.    Patient is an established patient of Dr Young    Patient has hx of:     PMH also significant for:      Since the last visit, ***    Pain 5/10        Had LESI in May with only 3 weeks pain - culd walk without cane and could walkthrough store     6/30had bilat L5/?S1 TFESI - 20% - walkign with cane,difficult to be siting in the car      Is havigndificulty with memory- had decreased topomax due to this   Wondering if pregabalin is contriibuint to that as well     Had lithotripsy 6/30     Would hold on pregbaqlin   Unsure cause of back pain   Pain with sitting in car is worse, sitting in a chair is ok   Bending forward feels better    Can be a searing pain or stabbing when overdoing - walking picking things   Aching with decrease in activity   All back pain no legs     Spreads across low back not down     Had seen pain mgmt at [facility]         Current Outpatient Medications:  Current Medications[1]    Current Anticoagulant Therapy: {NEGATIVE/POSITIVE:71249}    OARRS: {OARRS:86301}    Pain medications reviewed:  Yes    Past Medical History:  Medical History[2]    Past Surgical History:  Surgical History[3]    Family History:  Family History[4]    Social History:  Social History     Substance and Sexual Activity   Alcohol Use Yes    Comment: RARE     Tobacco Use History[5]  Social History     Substance and Sexual Activity   Drug Use Yes   • Types: Marijuana    Comment: THC GUMMIES RECREATIONAL -         Review of Systems     Physical Examination:  Vitals:    07/18/25 1457   BP: 128/80   Pulse: 106   SpO2: 98%     General:in no acute distress  Skin: skin color, texture, turgor normal, no rashes or lesions  HEENT:  "normocephalic, atraumatic, sclera non-icteric   Resp: unlabored on room air   Musculoskeletal:  Neck: {[facility] NECK:35809::\"Supple; good ROM.\"}  Back: {[facility] BACK:80139::\"No pain on palpation of the lumbar spine. Full ROM without reproducible pain.  Straight leg raising test negative bilaterally.  \"}  Extremities: {EXTREMITY EXAM:3013::\"Extremities normal. No deformities, edema, or skin discoloration\"}  Neurological:  Mental Status: alert and oriented  Gait: {[facility] CHRISTAL GAIT:45795}    New Imaging and Diagnostic Studies:  ***    ASSESSMENT:    Bree Lamar is a 34 y.o. adult with ***. Since last visit, the pain has ***.    {Assess/Plan SmartLinks (Optional):34683}    PLAN:    Diagnostics: Not indicated at this time ***     Physical Therapy and Rehabilitation: Continue HEP ***     Psychologically: No need for psychologic intervention from my standpoint. There are no mental health issues of which I am aware that are contributing to the patient's pain. There are no substance abuse or alcohol abuse issues of which I am aware that are contributing to the patient's pain. ***    Medication: Continue current medication regimen. See counseling note below. Prescription for *** sent to the pharmacy   Urine tox last collected -   Opioid agreement signed -   Pill count     Duration: years, chronic and ongoing ***    Intervention: None needed at this time. ***    7.   Follow up: ***    The patient continues to see benefit and improvement in their quality of life and ability to maintain ADLs.  Patient educated about the risks of taking opioids and operating a motor vehicle.  Patient reports no adverse side effects to current medication regimen.  Current regimen does allow patient to maintain ADLs.        Patient has been educated on the risks, benefits, and alternatives of controlled substances as well as the proper way to store these medications.  The patient and I discussed the nature of this medication and its side effects.  " We discussed tolerance, physical dependence, psychological dependence, addiction and opioid-induced hyperalgesia.   We discussed the fact that the patient should not drive an automobile or operate heavy machinery while taking this medication.     The above plan and management options were discussed at length with patient. Patient is in agreement with the above and verbalized understanding. It will be communicated with the referring physician via electronic record, fax, or mail.    Melodie Jay PA-C  July 18, 2025           [1]  Current Outpatient Medications   Medication Sig Dispense Refill   • albuterol (ProAir HFA) 90 mcg/actuation inhaler Inhale 2 puffs every 6 hours if needed for shortness of breath. 18 g 2   • carvedilol (Coreg) 12.5 mg tablet Take 1 tablet by mouth twice daily 60 tablet 11   • cetirizine (ZyrTEC) 10 mg tablet Take 1 tablet (10 mg) by mouth 2 times a day.     • cholecalciferol (Vitamin D-3) 5,000 Units tablet Take 1 tablet (125 mcg) by mouth once daily.     • cyclobenzaprine (Flexeril) 10 mg tablet Take 1 tablet (10 mg) by mouth 3 times a day as needed for muscle spasms. 90 tablet 0   • DULoxetine (Cymbalta) 30 mg DR capsule Take 1 capsule (30 mg) by mouth once daily.     • DULoxetine (Cymbalta) 60 mg DR capsule Take 1 capsule (60 mg) by mouth once daily.     • hydrOXYzine pamoate (Vistaril) 50 mg capsule Take 1 capsule (50 mg) by mouth 3 times a day as needed for anxiety.     • LORazepam (Ativan) 0.5 mg tablet Take 1 tablet (0.5 mg) by mouth once daily as needed for anxiety.     • Loryna, 28, 3-0.02 mg tablet Take by mouth once daily.     • multivitamin tablet Take 1 tablet by mouth once daily.     • ondansetron (Zofran) 4 mg tablet Take 1 tablet (4 mg) by mouth every 8 hours if needed for nausea or vomiting. 20 tablet 0   • pantoprazole (ProtoNix) 40 mg EC tablet TAKE 1 TABLET BY MOUTH TWICE DAILY. SWALLOW WHOLE, DO NOT CRUSH, SPLIT OR CHEW. 60 tablet 2   • pregabalin (Lyrica) 75 mg  capsule Take 1 capsule by mouth twice daily 60 capsule 0   • rosuvastatin (Crestor) 10 mg tablet Take 1 tablet (10 mg) by mouth once daily at bedtime. 100 tablet 3   • SUMAtriptan (Imitrex) 100 mg tablet Take 1 tablet (100 mg) by mouth 1 time if needed for migraine. PRN 9 tablet 0   • topiramate (Topamax) 50 mg tablet Take 1 tablet (50 mg) by mouth 2 times a day. 200 tablet 1   • traZODone (Desyrel) 100 mg tablet Take 1 tablet (100 mg) by mouth once daily at bedtime.     • triamcinolone (Kenalog) 0.1 % ointment        No current facility-administered medications for this visit.   [2]  Past Medical History:  Diagnosis Date   • Abscess of nose 01/30/2024   • Abscess, furuncle and carbuncle of nose 07/12/2018    Cellulitis of nose, external   • Acute left ankle pain 01/30/2024   • Allergic conjunctivitis of both eyes 01/30/2024   • Anxiety    • Anxiety disorder, unspecified 03/03/2021    Anxiety   • Autism ([facility])    • Chronic nausea    • Conjunctivitis 01/30/2024   • COVID    • DDD (degenerative disc disease), lumbar    • Dental infection 01/30/2024   • Depression    • Diarrhea 01/30/2024   • Fibromyalgia    • GERD (gastroesophageal reflux disease)    • Hyperlipidemia    • Hypertension    • Lower back pain    • Migraines    • Otalgia 01/30/2024   • Personal history of other diseases of the digestive system 03/03/2021    History of irritable bowel syndrome   • Personal history of other mental and behavioral disorders 02/23/2022    History of depression   • Personal history of other mental and behavioral disorders     History of autism   • Strain of left ankle 01/30/2024   [3]  Past Surgical History:  Procedure Laterality Date   • KIDNEY STONE SURGERY     • LITHOTRIPSY  2024   • MOUTH SURGERY  01/14/2016    [facility] teeth extraction   • OTHER SURGICAL HISTORY  02/2025    [place] CYSTO W/URETEROSCOPY W/LITHOTRIPSY   [4]  Family History  Problem Relation Name Age of Onset   • Asthma Sister     • Crohn's disease Brother      [5]  Social History  Tobacco Use   Smoking Status Former   • Current packs/day: 0.00   • Average packs/day: 1 pack/day for 16.0 years (16.0 ttl pk-yrs)   • Types: Cigarettes   • Start date: 2007   • Quit date: 2023   • Years since quittin.3   Smokeless Tobacco Never

## 2025-07-18 NOTE — Clinical Note
Next step in care  Hlding on increase pregaabalin  [facility]

## 2025-07-22 ENCOUNTER — HOSPITAL ENCOUNTER (OUTPATIENT)
Dept: RADIOLOGY | Facility: HOSPITAL | Age: 35
Discharge: HOME | End: 2025-07-22
Payer: MEDICARE

## 2025-07-22 DIAGNOSIS — N20.0 CALCULUS OF KIDNEY: ICD-10-CM

## 2025-07-22 PROCEDURE — 74018 RADEX ABDOMEN 1 VIEW: CPT | Performed by: RADIOLOGY

## 2025-07-22 PROCEDURE — 74018 RADEX ABDOMEN 1 VIEW: CPT

## 2025-07-30 DIAGNOSIS — M79.7 FIBROMYALGIA: ICD-10-CM

## 2025-07-30 RX ORDER — TOPIRAMATE 50 MG/1
50 TABLET, FILM COATED ORAL 2 TIMES DAILY
Qty: 200 TABLET | Refills: 1 | Status: SHIPPED | OUTPATIENT
Start: 2025-07-30

## 2025-07-30 ASSESSMENT — ENCOUNTER SYMPTOMS
CONSTITUTIONAL NEGATIVE: 1
PSYCHIATRIC NEGATIVE: 1
EYES NEGATIVE: 1
CARDIOVASCULAR NEGATIVE: 1
BACK PAIN: 1
ENDOCRINE NEGATIVE: 1
HEMATOLOGIC/LYMPHATIC NEGATIVE: 1
RESPIRATORY NEGATIVE: 1
NEUROLOGICAL NEGATIVE: 1
ALLERGIC/IMMUNOLOGIC NEGATIVE: 1

## 2025-08-01 ENCOUNTER — APPOINTMENT (OUTPATIENT)
Dept: PRIMARY CARE | Facility: CLINIC | Age: 35
End: 2025-08-01
Payer: MEDICARE

## 2025-08-01 VITALS
WEIGHT: 293 LBS | OXYGEN SATURATION: 98 % | HEART RATE: 106 BPM | DIASTOLIC BLOOD PRESSURE: 73 MMHG | SYSTOLIC BLOOD PRESSURE: 135 MMHG | BODY MASS INDEX: 55.81 KG/M2

## 2025-08-01 DIAGNOSIS — R94.4 DECREASED GFR: ICD-10-CM

## 2025-08-01 DIAGNOSIS — R41.89 BRAIN FOG: Primary | ICD-10-CM

## 2025-08-01 DIAGNOSIS — M79.7 FIBROMYALGIA: ICD-10-CM

## 2025-08-01 DIAGNOSIS — E66.813 CLASS 3 SEVERE OBESITY WITH SERIOUS COMORBIDITY AND BODY MASS INDEX (BMI) OF 50.0 TO 59.9 IN ADULT, UNSPECIFIED OBESITY TYPE: ICD-10-CM

## 2025-08-01 DIAGNOSIS — G37.9 DEMYELINATING DISORDER (MULTI): ICD-10-CM

## 2025-08-01 PROCEDURE — 3075F SYST BP GE 130 - 139MM HG: CPT | Performed by: FAMILY MEDICINE

## 2025-08-01 PROCEDURE — 3078F DIAST BP <80 MM HG: CPT | Performed by: FAMILY MEDICINE

## 2025-08-01 PROCEDURE — 99214 OFFICE O/P EST MOD 30 MIN: CPT | Performed by: FAMILY MEDICINE

## 2025-08-01 RX ORDER — ROSUVASTATIN CALCIUM 10 MG/1
10 TABLET, COATED ORAL NIGHTLY
Qty: 100 TABLET | Refills: 3 | Status: SHIPPED | OUTPATIENT
Start: 2025-08-01

## 2025-08-01 ASSESSMENT — ANXIETY QUESTIONNAIRES
7. FEELING AFRAID AS IF SOMETHING AWFUL MIGHT HAPPEN: NOT AT ALL
5. BEING SO RESTLESS THAT IT IS HARD TO SIT STILL: NOT AT ALL
1. FEELING NERVOUS, ANXIOUS, OR ON EDGE: NOT AT ALL
GAD7 TOTAL SCORE: 1
6. BECOMING EASILY ANNOYED OR IRRITABLE: NOT AT ALL
3. WORRYING TOO MUCH ABOUT DIFFERENT THINGS: NOT AT ALL
IF YOU CHECKED OFF ANY PROBLEMS ON THIS QUESTIONNAIRE, HOW DIFFICULT HAVE THESE PROBLEMS MADE IT FOR YOU TO DO YOUR WORK, TAKE CARE OF THINGS AT HOME, OR GET ALONG WITH OTHER PEOPLE: SOMEWHAT DIFFICULT
4. TROUBLE RELAXING: SEVERAL DAYS
2. NOT BEING ABLE TO STOP OR CONTROL WORRYING: NOT AT ALL

## 2025-08-01 ASSESSMENT — PATIENT HEALTH QUESTIONNAIRE - PHQ9
SUM OF ALL RESPONSES TO PHQ QUESTIONS 1-9: 6
6. FEELING BAD ABOUT YOURSELF - OR THAT YOU ARE A FAILURE OR HAVE LET YOURSELF OR YOUR FAMILY DOWN: NOT AT ALL
7. TROUBLE CONCENTRATING ON THINGS, SUCH AS READING THE NEWSPAPER OR WATCHING TELEVISION: SEVERAL DAYS
9. THOUGHTS THAT YOU WOULD BE BETTER OFF DEAD, OR OF HURTING YOURSELF: SEVERAL DAYS
2. FEELING DOWN, DEPRESSED OR HOPELESS: SEVERAL DAYS
5. POOR APPETITE OR OVEREATING: NOT AT ALL
4. FEELING TIRED OR HAVING LITTLE ENERGY: SEVERAL DAYS
SUM OF ALL RESPONSES TO PHQ9 QUESTIONS 1 AND 2: 2
1. LITTLE INTEREST OR PLEASURE IN DOING THINGS: SEVERAL DAYS
8. MOVING OR SPEAKING SO SLOWLY THAT OTHER PEOPLE COULD HAVE NOTICED. OR THE OPPOSITE, BEING SO FIGETY OR RESTLESS THAT YOU HAVE BEEN MOVING AROUND A LOT MORE THAN USUAL: NOT AT ALL
3. TROUBLE FALLING OR STAYING ASLEEP OR SLEEPING TOO MUCH: SEVERAL DAYS

## 2025-08-01 ASSESSMENT — ENCOUNTER SYMPTOMS
DYSPHORIC MOOD: 1
DECREASED CONCENTRATION: 1
FATIGUE: 1
BACK PAIN: 1

## 2025-08-01 NOTE — PATIENT INSTRUCTIONS
Rheumatology   Dr. Marina Hall ([place]) [phone]  Dr. Lynn Almeida ([place]) [phone]  Dr. Silvestre Arrington ([place]) [phone]  Dr. Dudley Jimenez et al. ([place]) [phone]     Therapeutic lifestyle changes are the cornerstone of weight loss. In general, a weight loss program should have a balanced, calorie-appropriate, and sustainable (i.e., lifelong) and otherwise healthy diet. Although not necessarily an endorsement of Weight Watchers, their point system does help to simplify calorie counting for a calorie-appropriate diet, and can be applied to regular foods. A multivitamin might help ensure adequate intake of the nutrients it contains during weight loss. A reasonable rate of weight loss is usually 0.5 to 1 pound per week. No more than 2 lbs weight loss per week. Exercise to help maintain muscle mass (and for overall health) is usually advisable.    Several medications may be used to assist with weight loss, e.g., Wegovy, Zepbound, Saxenda, Contrave, Nick/orlistat (over-the-counter). I do not recommend Adipex/phentermine.    In general, weight loss supplements have not been proven effective. Depending on the ingredients, there are many herbal supplements that, though they might generally be safe, do have the potential to cause, e.g., liver problems, increased risk of bleeding. Because many if not most herbal supplements have not been thoroughly studied, and are not as tightly regulated as FDA-approved medications, there may be inconsistent bioavailability and unpredictable drug interactions.    Please see the following page for some weight loss resources available at [facility]:  [email or web address]      Please return for a(n) follow-up appointment in 3 months, after tests to review results and options, earlier if any question or concern. Please return for your next  Wellness visit within the next 1-2 months, or 12 months after your last Wellness visit. Please schedule additional problem-focused appointment(s) to address additional problem(s). Simply mentioning or talking briefly about a problem or concern does not necessarily mean it is currently being addressed. Time constraints dictate that not every problem/concern can always be addressed.    Recommended vaccines:  Influenza, annual  Avoid taking Biotin (a vitamin, shows up particularly in hair/nail supplements) for a week prior to any blood tests, as it can interfere with certain results. Fasting for labs means 12 hours, nothing to eat or drink, except water and medications, unless directed otherwise.    For assistance with scheduling referrals or consultations, please call [phone]. For laboratory, radiology, and other tests, please call [phone] ([phone] for pediatrics). Please review prescription inserts and published information for possible adverse effects of all medications. Return after testing or consultation to review results and recommendations, if symptoms persist, change, worsen, or return, or if you have any question or concern. If you do not get results within 7-10 days, or you have any question or concern, please send a message, call the office ([phone]), or return to the office for a follow-up appointment. For non-emergencies, you may call the office. For emergencies, call 9-1-1 or go to the nearest Emergency Department. Please schedule additional appointment(s) to address concern(s) not addressed today. An annual Wellness visit is strongly recommended. A Wellness visit should be dedicated to addressing routine health maintenance matters (e.g., cancer screenings, cardiovascular screening, etc.). Problem-focused visits, typically prompted by symptoms or specific concerns, are usually conducted separately, particularly if multiple or complex problems need to be addressed.    In general,  results are not released or discussed over the telephone, but at an appointment or via  [facility]. Results of tests done through [facility] are released via  [facility] (see below).  [email or web address]   [facility] support line: [phone]

## 2025-08-01 NOTE — PROGRESS NOTES
"Subjective   Patient ID: Bree Lamar \"Mal\" is a 34 y.o. adult who presents for brain fog (More brain fog/headaches, not feeling herself, kidney stones & lithotripsy, on Lyrica now not sure if that is what's causing the fog.).  HPI Historian(s): Self    Recently had lithotripsy with urology.     c/o ongoing bilateral kidney pain. Stone remains on the left. Lithotripsy was only on the left. Seeing pain management. Started Lyrica a couple months ago, up to 75 mg BID. c/o brain fog, started/worsened since starting Lyrica.     Felt very unwell when she missed her BP medications, felt better upon taking the carvedilol.     Review of Systems   Constitutional:  Positive for fatigue.   Musculoskeletal:  Positive for back pain.   Psychiatric/Behavioral:  Positive for decreased concentration and dysphoric mood.    All other systems reviewed and are negative.    No LMP recorded. (Menstrual status: OCP).    Tobacco Use History[1]  Social History     Substance and Sexual Activity   Alcohol Use Yes    Comment: RARE     Social History     Substance and Sexual Activity   Drug Use Yes    Types: Marijuana    Comment: THC GUMMIES RECREATIONAL -       Family History[2]    Patient Care Team:  Tom Springer DO as PCP - General (Family Medicine)  Marlyn Sewell MD as Referring Physician (Pain Medicine)  SAMMI Farmer as Psychiatrist (Internal Medicine)  Nahed Lara MD as Consulting Physician (Urology)  SAMMI Farmer as Referring Physician (Internal Medicine)    RX Allergies[3]    Prior to Admission medications   Medication Sig Start Date End Date Taking? Authorizing Provider   albuterol (ProAir HFA) 90 mcg/actuation inhaler Inhale 2 puffs every 6 hours if needed for shortness of breath. 4/21/23  Yes Kody Juarez MD   carvedilol (Coreg) 12.5 mg tablet Take 1 tablet by mouth twice daily 12/24/24  Yes Kody Juarez MD   cetirizine (ZyrTEC) 10 mg tablet Take 1 tablet (10 mg) by mouth 2 times " a day. 2/4/21  Yes Historical Provider, MD   cholecalciferol (Vitamin D-3) 5,000 Units tablet Take 1 tablet (125 mcg) by mouth once daily. 1/23/19  Yes Historical Provider, MD   cyclobenzaprine (Flexeril) 10 mg tablet Take 1 tablet (10 mg) by mouth 3 times a day as needed for muscle spasms. 4/23/25  Yes Andrea Bernal MD   DULoxetine (Cymbalta) 30 mg DR capsule Take 1 capsule (30 mg) by mouth once daily. 4/10/23  Yes Historical Provider, MD   DULoxetine (Cymbalta) 60 mg DR capsule Take 1 capsule (60 mg) by mouth once daily. 4/10/23  Yes Historical Provider, MD   hydrOXYzine pamoate (Vistaril) 50 mg capsule Take 1 capsule (50 mg) by mouth 3 times a day as needed for anxiety. 7/19/21  Yes Historical Provider, MD   LORazepam (Ativan) 0.5 mg tablet Take 1 tablet (0.5 mg) by mouth once daily as needed for anxiety. 3/4/24  Yes Historical Provider, MD Boyd, 28, 3-0.02 mg tablet Take by mouth once daily. 2/17/25  Yes Historical Provider, MD   multivitamin tablet Take 1 tablet by mouth once daily.   Yes Historical Provider, MD   ondansetron (Zofran) 4 mg tablet Take 1 tablet (4 mg) by mouth every 8 hours if needed for nausea or vomiting. 7/1/25  Yes Tom Springer DO   pantoprazole (ProtoNix) 40 mg EC tablet TAKE 1 TABLET BY MOUTH TWICE DAILY. SWALLOW WHOLE, DO NOT CRUSH, SPLIT OR CHEW. 5/13/25  Yes Tom Springer DO   pregabalin (Lyrica) 75 mg capsule Take 1 capsule by mouth twice daily 7/15/25  Yes Pavel Quintero PA-C   rosuvastatin (Crestor) 10 mg tablet Take 1 tablet (10 mg) by mouth once daily at bedtime. 8/30/24  Yes Tom Springer DO   SUMAtriptan (Imitrex) 100 mg tablet Take 1 tablet (100 mg) by mouth 1 time if needed for migraine. PRN 2/1/24  Yes Tom Springer DO   topiramate (Topamax) 50 mg tablet Take 1 tablet (50 mg) by mouth 2 times a day. 7/30/25  Yes Tom Springer DO   traZODone (Desyrel) 100 mg tablet Take 1 tablet (100 mg) by mouth once daily at bedtime. 2/28/22  Yes  Historical Provider, MD   triamcinolone (Kenalog) 0.1 % ointment  24  Yes Historical Provider, MD   topiramate (Topamax) 50 mg tablet Take 1 tablet (50 mg) by mouth 2 times a day. 24  Tom Springer DO        Objective     Vitals:    25 1444   BP: 135/73   Pulse: 106   SpO2: 98%   Weight: (!) 152 kg (335 lb 6 oz)              Physical Exam  Vitals and nursing note reviewed.   Constitutional:       General: Mal is not in acute distress.     Appearance: Normal appearance. Mal is obese.      Comments: Cane.   HENT:      Head: Normocephalic and atraumatic.     Eyes:      General: No scleral icterus.     Extraocular Movements: Extraocular movements intact.      Conjunctiva/sclera: Conjunctivae normal.     Pulmonary:      Effort: Pulmonary effort is normal. No respiratory distress.     Skin:     General: Skin is warm and dry.      Coloration: Skin is not jaundiced.     Neurological:      Mental Status: Mal is alert and oriented to person, place, and time. Mental status is at baseline.     Psychiatric:         Behavior: Behavior normal.         Assessment & Plan  Brain fog  Multifactorial, likely polypharmacy playing a role. Encouraged gradually starting cardio exercise routine.       Decreased GFR    Orders:    Follow Up In Primary Care    Class 3 severe obesity with serious comorbidity and body mass index (BMI) of 50.0 to 59.9 in adult, unspecified obesity type    Orders:    QUEST CORTISOL, FREE, 24 HOUR URINE; Future    Demyelinating disorder (Multi)  Unclear history. May consider neurology referral.       Fibromyalgia    Orders:    rosuvastatin (Crestor) 10 mg tablet; Take 1 tablet (10 mg) by mouth once daily at bedtime.                      [1]   Social History  Tobacco Use   Smoking Status Former    Current packs/day: 0.00    Average packs/day: 1 pack/day for 16.0 years (16.0 ttl pk-yrs)    Types: Cigarettes    Start date: 2007    Quit date: 2023    Years since quittin.4    Smokeless Tobacco Never   [2]   Family History  Problem Relation Name Age of Onset    Asthma Sister      Crohn's disease Brother     [3]   Allergies  Allergen Reactions    Shellfish Derived Hives    Adhesive Tape-Silicones Itching and Rash

## 2025-08-04 ENCOUNTER — APPOINTMENT (OUTPATIENT)
Dept: GASTROENTEROLOGY | Facility: CLINIC | Age: 35
End: 2025-08-04
Payer: MEDICARE

## 2025-08-04 DIAGNOSIS — K21.9 GASTROESOPHAGEAL REFLUX DISEASE, UNSPECIFIED WHETHER ESOPHAGITIS PRESENT: Primary | ICD-10-CM

## 2025-08-04 DIAGNOSIS — K58.9 IRRITABLE BOWEL SYNDROME, UNSPECIFIED TYPE: ICD-10-CM

## 2025-08-04 DIAGNOSIS — R11.0 NAUSEA IN ADULT: ICD-10-CM

## 2025-08-04 PROCEDURE — 99204 OFFICE O/P NEW MOD 45 MIN: CPT | Performed by: INTERNAL MEDICINE

## 2025-08-04 ASSESSMENT — ENCOUNTER SYMPTOMS
ADENOPATHY: 0
NERVOUS/ANXIOUS: 0
SHORTNESS OF BREATH: 0
MYALGIAS: 0
HEADACHES: 0
UNEXPECTED WEIGHT CHANGE: 0
ROS GI COMMENTS: AS PER HPI
FEVER: 0
EYE REDNESS: 0
DIFFICULTY URINATING: 0
BRUISES/BLEEDS EASILY: 0
ARTHRALGIAS: 0
FATIGUE: 0
CHILLS: 0
SORE THROAT: 0

## 2025-08-04 NOTE — PATIENT INSTRUCTIONS
Suggestions for improving your acid reflux symptoms:  Try to minimize acidic foods such as citrus fruits, tomatoes, and pop. Also try to avoid chocolate, peppermint, alcohol, and caffeine.  Eat more frequent smaller meals instead of a few large meals.  Losing weight will also help improve acid reflux.  Avoid eating within 2-3 hours of lying down.  I agree with trying to elevate the head of your bed to help decrease nighttime reflux.  Do your best to take the second dose of pantoprazole prior to eating the evening meal.  You could consider setting a daily timer to help remember to take it.  I think it is more important to take the pantoprazole on an empty stomach than it is to take right before eating, so it's OK if there is some time between taking the medication and eating dinner.    I think your nausea could be a combination of acid reflux-related and also a side effect from some of your medications.  You can eat paul chews in addition to taking the Zofran to help with this.    If your bowels start to become more irregular, you could try taking IBgard (enteric-coated peppermint oil) since this can help to settle the stomach down.

## 2025-08-04 NOTE — PROGRESS NOTES
"  Assessment/Plan    Bree M Willard \"Mal\" is a 34 y.o. adult with PMHx of kidney stones s/p lithotripsy, fibromyalgia on Lyrica, morbid obesity (BMI 56), autism, anxiety who presents to GI clinic for evaluation of multiple symptoms including GERD, hiatal hernia, nausea, and irregular bowels.    They have had ongoing symptoms and have had GI workup in the past (2014) which included EGD and colonoscopy that were normal, without evidence of celiac, H pylori, or IBD.  They have ongoing GERD symptoms which is likely multifactorial - they often forget or don't take their second dose of pantoprazole correctly, they often eat prior to going to bed, they sleep fairly flat, and they often overeat.  Morbid obesity is also likely contributing to worsening GERD symptoms.  We discussed trying to set a timer so they can take their second dose of pantoprazole prior to eating dinner consistently.  Also reviewed diet/lifestyle changes to decrease reflux.  They state they are interested in workup for bariatric surgery, which would also likely help their GI symptoms.  They have chronic nausea that could in part be due to ongoing reflux, although it could also be a side effect from one of the numerous medications they are taking.  Zofran helps, and we also discussed controlling reflux and adding paul chews to help with nausea.  Mal can follow up PRN.         Subjective     History of Present Illness   Bree M Willard \"Mal\" is a 34 y.o. adult with PMHx of kidney stones s/p lithotripsy, fibromyalgia on Lyrica, morbid obesity (BMI 56), autism, anxiety who presents to GI clinic for further evaluation of multiple symptoms including GERD, hiatal hernia, nausea, and irregular bowels.  Has had GI issues for a while - GERD and IBS, hiatal and umbilical hernias, chronic nausea  Was diagnosed with IBS back in 2014 after negative EGD and colonoscopy  Has chronic nausea - dry heaves but never vomits  Has been on Zofran for a few years, " tends to help  Gets full quickly sometimes, other times doesn't get full fast enough and overeats  Taking pantoprazole in morning and at night - has trouble remembering the second dose and trouble timing it to be before dinner  Having reflux while sleeping, can come up in her throat while sleeping  Does often eat prior to going to bed  Unable to prop head up on pillows  Bowels can go between diarrhea and constipation, lately bowels have been good  Interested in bariatric surgery workup, and is going to discuss this with PCP    Chart review:  Had EGD/colonoscopy 8/2014 ([facility]) for evaluation of diarrhea - colonoscopy normal with negative random colon biopsies; EGD with normal gastric and duodenal biopsies (no EGD report available)    Past Medical History  As per HPI.     Social History  Mal  reports that Mal quit smoking about 2 years ago. Mal's smoking use included cigarettes. Mal started smoking about 18 years ago. Mal has a 16 pack-year smoking history. Mal has never used smokeless tobacco. Mal reports current alcohol use. Mal reports current drug use. Drug: Marijuana.     Family History  Mal's family history includes Asthma in Mal's sister; Crohn's disease in Mal's brother.   Grandmother with colon cancer    Review of Systems  Review of Systems   Constitutional:  Negative for chills, fatigue, fever and unexpected weight change.   HENT:  Negative for sore throat.    Eyes:  Negative for redness and visual disturbance.   Respiratory:  Negative for shortness of breath.    Cardiovascular:  Negative for chest pain.   Gastrointestinal:         As per HPI   Genitourinary:  Negative for difficulty urinating.   Musculoskeletal:  Negative for arthralgias and myalgias.   Skin:  Negative for rash.   Neurological:  Negative for headaches.        Brain fog   Hematological:  Negative for adenopathy. Does not bruise/bleed easily.   Psychiatric/Behavioral:  The patient is not nervous/anxious.    All other systems  reviewed and are negative.      Allergies  RX Allergies[1]    Medications  Current Outpatient Medications   Medication Instructions    albuterol (ProAir HFA) 90 mcg/actuation inhaler 2 puffs, inhalation, Every 6 hours PRN    carvedilol (COREG) 12.5 mg, oral, 2 times daily    cetirizine (ZyrTEC) 10 mg tablet 1 tablet, 2 times daily    cholecalciferol (Vitamin D-3) 5,000 Units tablet 1 tablet, Daily    cyclobenzaprine (FLEXERIL) 10 mg, oral, 3 times daily PRN    DULoxetine (CYMBALTA) 60 mg, Daily RT    DULoxetine (CYMBALTA) 30 mg, Daily RT    hydrOXYzine pamoate (VISTARIL) 50 mg, 3 times daily PRN    LORazepam (ATIVAN) 0.5 mg, Daily PRN    [place], 28, 3-0.02 mg tablet Daily    multivitamin tablet 1 tablet, Daily    ondansetron (ZOFRAN) 4 mg, oral, Every 8 hours PRN    pantoprazole (ProtoNix) 40 mg EC tablet TAKE 1 TABLET BY MOUTH TWICE DAILY. SWALLOW WHOLE, DO NOT CRUSH, SPLIT OR CHEW.    pregabalin (LYRICA) 75 mg, oral, 2 times daily    rosuvastatin (CRESTOR) 10 mg, oral, Nightly    SUMAtriptan (IMITREX) 100 mg, oral, Once as needed, PRN    topiramate (TOPAMAX) 50 mg, oral, 2 times daily    traZODone (Desyrel) 100 mg tablet 1 tablet, Nightly    triamcinolone (Kenalog) 0.1 % ointment         Objective     Visit Vitals  OB Status OCP   Smoking Status Former       Physical Exam  Constitutional:       Appearance: Normal appearance. Mal is obese.   HENT:      Mouth/Throat:      Mouth: Mucous membranes are moist.     Eyes:      Extraocular Movements: Extraocular movements intact.       Cardiovascular:      Rate and Rhythm: Normal rate and regular rhythm.   Pulmonary:      Breath sounds: Normal breath sounds.   Abdominal:      General: Bowel sounds are normal. There is no distension.      Palpations: Abdomen is soft.      Tenderness: There is no abdominal tenderness. There is no guarding or rebound.     Musculoskeletal:         General: No swelling.     Skin:     General: Skin is warm and dry.     Neurological:       General: No focal deficit present.      Mental Status: Mal is alert.     Psychiatric:         Mood and Affect: Mood normal.         Behavior: Behavior normal.           Lab Results   Component Value Date    WBC 15.5 (H) 06/26/2025    WBC 9.7 12/21/2024    HGB 11.9 (L) 06/26/2025    HGB 12.7 12/21/2024    HCT 38.2 06/26/2025    HCT 39.9 12/21/2024    MCV 88 06/26/2025    MCV 86 12/21/2024     (H) 06/26/2025     12/21/2024     Lab Results   Component Value Date     06/26/2025     (L) 12/19/2024    K 4.1 06/26/2025    K 4.1 12/19/2024     (H) 06/26/2025     12/19/2024    CO2 22 06/26/2025    CO2 19 (L) 12/19/2024    BUN 19 06/26/2025    BUN 11 12/19/2024    CREATININE 1.09 06/26/2025    CREATININE 0.94 12/19/2024    CALCIUM 8.9 06/26/2025    CALCIUM 9.3 12/19/2024    PROT 7.1 12/19/2024    PROT 7.0 03/20/2024    BILITOT 0.4 12/19/2024    BILITOT 0.4 03/20/2024    ALKPHOS 104 12/19/2024    ALKPHOS 89 03/20/2024    ALT 15 12/19/2024    ALT 15 03/20/2024    AST 14 12/19/2024    AST 20 03/20/2024    GLUCOSE 94 06/26/2025    GLUCOSE 105 (H) 12/19/2024       Recent Imaging  Imaging  No results found.    Cardiology, Vascular, and Other Imaging  No other imaging results found for the past 7 days        Eugene Fay MD                [1]   Allergies  Allergen Reactions    Shellfish Derived Hives    Adhesive Tape-Silicones Itching and Rash

## 2025-08-08 ENCOUNTER — APPOINTMENT (OUTPATIENT)
Dept: PRIMARY CARE | Facility: CLINIC | Age: 35
End: 2025-08-08
Payer: MEDICARE

## 2025-08-10 DIAGNOSIS — M54.16 LUMBAR RADICULOPATHY: ICD-10-CM

## 2025-08-10 DIAGNOSIS — K30 FUNCTIONAL DYSPEPSIA: ICD-10-CM

## 2025-08-11 RX ORDER — PANTOPRAZOLE SODIUM 40 MG/1
TABLET, DELAYED RELEASE ORAL
Qty: 60 TABLET | Refills: 0 | Status: SHIPPED | OUTPATIENT
Start: 2025-08-11

## 2025-08-11 RX ORDER — PREGABALIN 75 MG/1
75 CAPSULE ORAL 2 TIMES DAILY
Qty: 60 CAPSULE | Refills: 2 | Status: SHIPPED | OUTPATIENT
Start: 2025-08-11 | End: 2025-11-09

## 2025-08-12 DIAGNOSIS — M54.16 LUMBAR RADICULOPATHY: Primary | ICD-10-CM

## 2025-08-15 ENCOUNTER — HOSPITAL ENCOUNTER (OUTPATIENT)
Dept: RADIOLOGY | Facility: CLINIC | Age: 35
Discharge: HOME | End: 2025-08-15
Payer: MEDICARE

## 2025-08-15 DIAGNOSIS — M54.16 LUMBAR RADICULOPATHY: ICD-10-CM

## 2025-08-15 PROCEDURE — 72148 MRI LUMBAR SPINE W/O DYE: CPT

## (undated) DEVICE — HOLMIUM MASTERPULSE HF

## (undated) DEVICE — SYSTEM, EASY CATCHER, DISP, NON-STERILE

## (undated) DEVICE — BASKET, STONE 1.9 X 120 SKYLITE TIPLESS 12MM BASKET

## (undated) DEVICE — Device

## (undated) DEVICE — HOLMIUM MP 365 FORTEC FIBER

## (undated) DEVICE — CATHETER, URETERAL, OPEN END, 5 FR, 70 CM

## (undated) DEVICE — GLOVE, PROTEXIS PI CLASSIC, SZ-7.0, PF, LF

## (undated) DEVICE — IRRIGATION KIT, PUMPING, SINGLE ACTION, SYRINGE, 10 CC